# Patient Record
Sex: MALE | Race: WHITE | NOT HISPANIC OR LATINO | URBAN - METROPOLITAN AREA
[De-identification: names, ages, dates, MRNs, and addresses within clinical notes are randomized per-mention and may not be internally consistent; named-entity substitution may affect disease eponyms.]

---

## 2017-08-11 ENCOUNTER — OUTPATIENT (OUTPATIENT)
Dept: OUTPATIENT SERVICES | Facility: HOSPITAL | Age: 67
LOS: 1 days | End: 2017-08-11
Payer: COMMERCIAL

## 2017-08-11 VITALS
RESPIRATION RATE: 18 BRPM | SYSTOLIC BLOOD PRESSURE: 173 MMHG | OXYGEN SATURATION: 97 % | TEMPERATURE: 98 F | WEIGHT: 264.55 LBS | HEART RATE: 53 BPM | HEIGHT: 72 IN | DIASTOLIC BLOOD PRESSURE: 92 MMHG

## 2017-08-11 DIAGNOSIS — Z98.890 OTHER SPECIFIED POSTPROCEDURAL STATES: Chronic | ICD-10-CM

## 2017-08-11 DIAGNOSIS — N36.8 OTHER SPECIFIED DISORDERS OF URETHRA: Chronic | ICD-10-CM

## 2017-08-11 DIAGNOSIS — Z01.810 ENCOUNTER FOR PREPROCEDURAL CARDIOVASCULAR EXAMINATION: ICD-10-CM

## 2017-08-11 LAB
ALBUMIN SERPL ELPH-MCNC: 4.2 G/DL — SIGNIFICANT CHANGE UP (ref 3.3–5.2)
ALP SERPL-CCNC: 44 U/L — SIGNIFICANT CHANGE UP (ref 40–120)
ALT FLD-CCNC: 12 U/L — SIGNIFICANT CHANGE UP
ANION GAP SERPL CALC-SCNC: 12 MMOL/L — SIGNIFICANT CHANGE UP (ref 5–17)
APTT BLD: 35 SEC — SIGNIFICANT CHANGE UP (ref 27.5–37.4)
AST SERPL-CCNC: 12 U/L — SIGNIFICANT CHANGE UP
BASOPHILS # BLD AUTO: 0 K/UL — SIGNIFICANT CHANGE UP (ref 0–0.2)
BASOPHILS NFR BLD AUTO: 0.3 % — SIGNIFICANT CHANGE UP (ref 0–2)
BILIRUB SERPL-MCNC: 0.6 MG/DL — SIGNIFICANT CHANGE UP (ref 0.4–2)
BUN SERPL-MCNC: 28 MG/DL — HIGH (ref 8–20)
CALCIUM SERPL-MCNC: 8.9 MG/DL — SIGNIFICANT CHANGE UP (ref 8.6–10.2)
CHLORIDE SERPL-SCNC: 103 MMOL/L — SIGNIFICANT CHANGE UP (ref 98–107)
CO2 SERPL-SCNC: 27 MMOL/L — SIGNIFICANT CHANGE UP (ref 22–29)
CREAT SERPL-MCNC: 1.05 MG/DL — SIGNIFICANT CHANGE UP (ref 0.5–1.3)
EOSINOPHIL # BLD AUTO: 0.2 K/UL — SIGNIFICANT CHANGE UP (ref 0–0.5)
EOSINOPHIL NFR BLD AUTO: 2.9 % — SIGNIFICANT CHANGE UP (ref 0–5)
GLUCOSE SERPL-MCNC: 88 MG/DL — SIGNIFICANT CHANGE UP (ref 70–115)
HCT VFR BLD CALC: 44.9 % — SIGNIFICANT CHANGE UP (ref 42–52)
HGB BLD-MCNC: 15.1 G/DL — SIGNIFICANT CHANGE UP (ref 14–18)
INR BLD: 1.02 RATIO — SIGNIFICANT CHANGE UP (ref 0.88–1.16)
LYMPHOCYTES # BLD AUTO: 1.9 K/UL — SIGNIFICANT CHANGE UP (ref 1–4.8)
LYMPHOCYTES # BLD AUTO: 29 % — SIGNIFICANT CHANGE UP (ref 20–55)
MCHC RBC-ENTMCNC: 31.2 PG — HIGH (ref 27–31)
MCHC RBC-ENTMCNC: 33.6 G/DL — SIGNIFICANT CHANGE UP (ref 32–36)
MCV RBC AUTO: 92.8 FL — SIGNIFICANT CHANGE UP (ref 80–94)
MONOCYTES # BLD AUTO: 0.6 K/UL — SIGNIFICANT CHANGE UP (ref 0–0.8)
MONOCYTES NFR BLD AUTO: 9.8 % — SIGNIFICANT CHANGE UP (ref 3–10)
NEUTROPHILS # BLD AUTO: 3.8 K/UL — SIGNIFICANT CHANGE UP (ref 1.8–8)
NEUTROPHILS NFR BLD AUTO: 57.8 % — SIGNIFICANT CHANGE UP (ref 37–73)
PLATELET # BLD AUTO: 174 K/UL — SIGNIFICANT CHANGE UP (ref 150–400)
POTASSIUM SERPL-MCNC: 4 MMOL/L — SIGNIFICANT CHANGE UP (ref 3.5–5.3)
POTASSIUM SERPL-SCNC: 4 MMOL/L — SIGNIFICANT CHANGE UP (ref 3.5–5.3)
PROT SERPL-MCNC: 6.5 G/DL — LOW (ref 6.6–8.7)
PROTHROM AB SERPL-ACNC: 11.2 SEC — SIGNIFICANT CHANGE UP (ref 9.8–12.7)
RBC # BLD: 4.84 M/UL — SIGNIFICANT CHANGE UP (ref 4.6–6.2)
RBC # FLD: 13 % — SIGNIFICANT CHANGE UP (ref 11–15.6)
SODIUM SERPL-SCNC: 142 MMOL/L — SIGNIFICANT CHANGE UP (ref 135–145)
WBC # BLD: 6.5 K/UL — SIGNIFICANT CHANGE UP (ref 4.8–10.8)
WBC # FLD AUTO: 6.5 K/UL — SIGNIFICANT CHANGE UP (ref 4.8–10.8)

## 2017-08-11 PROCEDURE — 36415 COLL VENOUS BLD VENIPUNCTURE: CPT

## 2017-08-11 PROCEDURE — 93010 ELECTROCARDIOGRAM REPORT: CPT

## 2017-08-11 PROCEDURE — 85730 THROMBOPLASTIN TIME PARTIAL: CPT

## 2017-08-11 PROCEDURE — 93005 ELECTROCARDIOGRAM TRACING: CPT

## 2017-08-11 PROCEDURE — 80053 COMPREHEN METABOLIC PANEL: CPT

## 2017-08-11 PROCEDURE — G0463: CPT

## 2017-08-11 PROCEDURE — 85027 COMPLETE CBC AUTOMATED: CPT

## 2017-08-11 PROCEDURE — 85610 PROTHROMBIN TIME: CPT

## 2017-08-11 NOTE — H&P PST ADULT - FAMILY HISTORY
Father  Still living? No  Family history of CVA, Age at diagnosis: 41-50     Mother  Still living? No  Family history of CVA, Age at diagnosis: 61-70

## 2017-08-11 NOTE — H&P PST ADULT - PMH
Cardiomyopathy    Chest pain    ETOH abuse    HLD (hyperlipidemia)    HTN (hypertension)    Mild aortic insufficiency    Obesity    Urethral disorder  urine retention likely secondary to urethral stricture

## 2017-08-11 NOTE — H&P PST ADULT - NSANTHOSAYNRD_GEN_A_CORE
No. ABDON screening performed.  STOP BANG Legend: 0-2 = LOW Risk; 3-4 = INTERMEDIATE Risk; 5-8 = HIGH Risk

## 2017-08-11 NOTE — ASU PATIENT PROFILE, ADULT - PMH
Cardiomyopathy    Chest pain    HLD (hyperlipidemia)    HTN (hypertension)    Mild aortic insufficiency    Obesity

## 2017-08-11 NOTE — H&P PST ADULT - HISTORY OF PRESENT ILLNESS
This is a 67 y/o male with h/o HTN, HPL, mild CM (possibly ETOH), ETOH abuse (last drink about one month ago) s/p recent c/o chest burning after night of drinking ETOH.  NST revealed anteroseptal ischemia, transient LV dilation, and mildly reduced EF.  Patient presents today for PST for elective LHC to r/o obstructive CAD.

## 2017-08-15 ENCOUNTER — INPATIENT (INPATIENT)
Facility: HOSPITAL | Age: 67
LOS: 0 days | Discharge: HOME | DRG: 247 | End: 2017-08-16
Attending: INTERNAL MEDICINE | Admitting: INTERNAL MEDICINE
Payer: COMMERCIAL

## 2017-08-15 VITALS
DIASTOLIC BLOOD PRESSURE: 92 MMHG | TEMPERATURE: 98 F | WEIGHT: 259.93 LBS | RESPIRATION RATE: 18 BRPM | HEIGHT: 72 IN | SYSTOLIC BLOOD PRESSURE: 168 MMHG | HEART RATE: 62 BPM

## 2017-08-15 DIAGNOSIS — N36.8 OTHER SPECIFIED DISORDERS OF URETHRA: Chronic | ICD-10-CM

## 2017-08-15 DIAGNOSIS — R94.39 ABNORMAL RESULT OF OTHER CARDIOVASCULAR FUNCTION STUDY: ICD-10-CM

## 2017-08-15 PROCEDURE — 93010 ELECTROCARDIOGRAM REPORT: CPT

## 2017-08-15 RX ORDER — ATORVASTATIN CALCIUM 80 MG/1
20 TABLET, FILM COATED ORAL AT BEDTIME
Qty: 0 | Refills: 0 | Status: DISCONTINUED | OUTPATIENT
Start: 2017-08-15 | End: 2017-08-16

## 2017-08-15 RX ORDER — LISINOPRIL 2.5 MG/1
40 TABLET ORAL DAILY
Qty: 0 | Refills: 0 | Status: DISCONTINUED | OUTPATIENT
Start: 2017-08-15 | End: 2017-08-16

## 2017-08-15 RX ORDER — PRASUGREL 5 MG/1
10 TABLET, FILM COATED ORAL DAILY
Qty: 0 | Refills: 0 | Status: DISCONTINUED | OUTPATIENT
Start: 2017-08-16 | End: 2017-08-16

## 2017-08-15 RX ORDER — ASPIRIN/CALCIUM CARB/MAGNESIUM 324 MG
325 TABLET ORAL DAILY
Qty: 0 | Refills: 0 | Status: DISCONTINUED | OUTPATIENT
Start: 2017-08-15 | End: 2017-08-16

## 2017-08-15 RX ORDER — AMLODIPINE BESYLATE 2.5 MG/1
5 TABLET ORAL DAILY
Qty: 0 | Refills: 0 | Status: DISCONTINUED | OUTPATIENT
Start: 2017-08-15 | End: 2017-08-16

## 2017-08-15 RX ORDER — ASPIRIN/CALCIUM CARB/MAGNESIUM 324 MG
1 TABLET ORAL
Qty: 0 | Refills: 0 | COMMUNITY

## 2017-08-15 RX ORDER — HYDRALAZINE HCL 50 MG
10 TABLET ORAL ONCE
Qty: 0 | Refills: 0 | Status: COMPLETED | OUTPATIENT
Start: 2017-08-15 | End: 2017-08-15

## 2017-08-15 RX ADMIN — ATORVASTATIN CALCIUM 20 MILLIGRAM(S): 80 TABLET, FILM COATED ORAL at 21:09

## 2017-08-15 RX ADMIN — AMLODIPINE BESYLATE 5 MILLIGRAM(S): 2.5 TABLET ORAL at 13:51

## 2017-08-15 RX ADMIN — Medication 10 MILLIGRAM(S): at 17:47

## 2017-08-15 NOTE — DISCHARGE NOTE ADULT - CARE PLAN
Principal Discharge DX:	CAD (coronary artery disease)  Goal:	No angina; optimal cardiac function Principal Discharge DX:	CAD (coronary artery disease)  Goal:	No angina; optimal cardiac function  Instructions for follow-up, activity and diet:	No heavy lifting, driving, sex, tub baths, swimming, or any activity that submerges the lower half of the body in water for 48 hours.  Limited walking and stairs for 48 hours.    Change the bandaid after 24 hours and every 24 hours after that.  Keep the puncture site dry and covered with a bandaid until a scab forms.    Observe the site frequently.  If bleeding or a large lump (the size of a golf ball or bigger) occurs lie flat, apply continuous direct pressure just above the puncture site for at least 10 minutes, and notify your physician immediately.  If the bleeding cannot be controlled, call 911 immediately for assistance.  Notify your physician of pain, swelling or any drainage.    Notify your physician immediately if coldness, numbness, discoloration or pain in your foot occurs.

## 2017-08-15 NOTE — CONSULT NOTE ADULT - SUBJECTIVE AND OBJECTIVE BOX
Patient is a 66y old  Male who presents with a chief complaint of dyspnea and abnormal nuclear stress test.    HPI:  66 year old male with hypertension, hyperlipidemia, and obesity who has been having dyspnea on exertion and had a nuclear stress test which showed anterospetal ischemia.  His EF was also diminished.    PAST MEDICAL & SURGICAL HISTORY:  Urethral disorder: urine retention likely secondary to urethral stricture  ETOH abuse  Chest pain  Obesity  HTN (hypertension)  HLD (hyperlipidemia)  Cardiomyopathy  Mild aortic insufficiency  Acute urethral obstruction: surgical procedure to relieve obstruction      Allergies    No Known Allergies    Intolerances        MEDICATIONS  (STANDING):  aspirin 325 milliGRAM(s) Oral daily  lisinopril 40 milliGRAM(s) Oral daily  atorvastatin 20 milliGRAM(s) Oral at bedtime    MEDICATIONS  (PRN):    aspirin 325 milliGRAM(s) Oral daily  lisinopril 40 milliGRAM(s) Oral daily  atorvastatin 20 milliGRAM(s) Oral at bedtime      FAMILY HISTORY:  Family history of CVA (Father, Mother)      SOCIAL HISTORY:    CIGARETTES:  Former    ALCOHOL: Moderate    REVIEW OF SYSTEMS:  CONSTITUTIONAL: No fever, weight loss, or fatigue  EYES: No eye pain, visual disturbances, or discharge  ENMT:  No difficulty hearing, tinnitus, vertigo; No sinus or throat pain  NECK: No pain or stiffness  RESPIRATORY: No cough, wheezing, chills or hemoptysis; No Shortness of Breath  CARDIOVASCULAR: No chest pain, palpitations, passing out, dizziness, or leg swelling  GASTROINTESTINAL: No abdominal or epigastric pain. No nausea, vomiting, or hematemesis; No diarrhea or constipation. No melena or hematochezia.  GENITOURINARY: No dysuria, frequency, hematuria, or incontinence  NEUROLOGICAL: No headaches, memory loss, loss of strength, numbness, or tremors  SKIN: No itching, burning, rashes, or lesions   LYMPH Nodes: No enlarged glands  ENDOCRINE: No heat or cold intolerance; No hair loss  MUSCULOSKELETAL: No joint pain or swelling; No muscle, back, or extremity pain  PSYCHIATRIC: No depression, anxiety, mood swings, or difficulty sleeping  HEME/LYMPH: No easy bruising, or bleeding gums  ALLERY AND IMMUNOLOGIC: No hives or eczema	    Vital Signs Last 24 Hrs  T(C): 36.8 (15 Aug 2017 08:21), Max: 36.8 (15 Aug 2017 08:21)  T(F): 98.2 (15 Aug 2017 08:21), Max: 98.2 (15 Aug 2017 08:21)  HR: 52 (15 Aug 2017 12:15) (52 - 62)  BP: 169/97 (15 Aug 2017 12:15) (147/94 - 169/97)  BP(mean): --  RR: 17 (15 Aug 2017 12:15) (12 - 18)  SpO2: 97% (15 Aug 2017 12:15) (96% - 97%)    Daily Height in cm: 182.88 (15 Aug 2017 08:21)    Daily     I&O's Detail      PHYSICAL EXAM:  Appearance: Normal, well nourished	  HEENT:   Normal oral mucosa, PERRL, EOMI, sclera non-icteric	  Lymphatic: No cervical lymphadenopathy  Cardiovascular: Normal S1 S2, No JVD, No cardiac murmurs, No carotid bruits, No peripheral edema  Respiratory: Lungs clear to auscultation	  Psychiatry: A & O x 3, Mood & affect appropriate  Gastrointestinal:  Soft, Non-tender, + BS, no bruits	  Skin: No rashes, No ecchymoses, No cyanosis  Neurologic: Grossly non-focal with full strength in all four extremities  Extremities: Normal range of motion, No clubbing, cyanosis or edema  Vascular: Peripheral pulses palpable 2+ bilaterally            I&O's Summary    BNP  RADIOLOGY & ADDITIONAL STUDIES:    Assessment:  66 year old male with hypertension, hyperlipidemia, and obesity who has been having dyspnea on exertion and had a nuclear stress test which showed anterospetal ischemia.  His EF was also diminished.        Plan:    Cardiac catheterization.  Cardiac catheterization and possible percutaneous intervention recommended.  Risks, benefits, and alternatives reviewed.  Risks including but not limited to MI, death, stroke, bleeding, infection, vessel injury, hematoma, renal failure, allergic reaction, urgent open heart surgery, restenosis and stent thrombosis were reviewed.  All questions answered.  Patient is agreeable to proceed.

## 2017-08-15 NOTE — DISCHARGE NOTE ADULT - CARE PROVIDER_API CALL
Jil Ayers (MD), Cardiovascular Disease  [address] [place] [address]  Phone: [phone]  Fax: [phone]

## 2017-08-15 NOTE — DISCHARGE NOTE ADULT - NS AS ACTIVITY OBS
Do not drive or operate machinery/No Heavy lifting/straining/Walking-Outdoors allowed/Do not make important decisions/Showering allowed/Walking-Indoors allowed

## 2017-08-15 NOTE — DISCHARGE NOTE ADULT - PATIENT PORTAL LINK FT
“You can access the FollowHealth Patient Portal, offered by [facility], by registering with the following website: [email or web address]”

## 2017-08-15 NOTE — PROGRESS NOTE ADULT - SUBJECTIVE AND OBJECTIVE BOX
Subjective:  s/p LHC   < from: Cardiac Cath Lab - Adult (08.15.17 @ 09:32) >  VENTRICLES: Analysis of regional contractile function demonstrated mild  posterobasal hypokinesis. Global left ventricular function was borderline  normal. EF estimated was 55 %.  VALVES: MITRAL VALVE: The mitral valve exhibited mild regurgitation.  CORONARY VESSELS: The coronary circulation is right dominant.  LM:   --  LM: Angiography showed mild atherosclerosis with no flow limiting  lesions.  LAD:   --  Proximal LAD: There was a tubular 90 % stenosis. The lesion was  moderately calcified.  --  Mid LAD: There was a diffuse 80 % stenosis.  CX:   --  Circumflex: Angiography showed minor luminal irregularities with  no flow limiting lesions.  RI:   --  Proximal ramus intermedius: Angiography showed mild  atherosclerosis with no flow limiting lesions.  RCA:   --  Proximal RCA: There was a discrete 20 % stenosis. The lesion was  eccentric.  --  RPDA: There was a tubular 90 % stenosis at the ostium of the vessel  segment.  --  RPLS: The distal vessel was supplied by moderate collaterals from  septal branches of LAD and the distal circumflex. There was a discrete 100  % stenosis.    1ST LESION INTERVENTIONS: A successful drug-eluting stent was performed on  the 90 % lesion in the proximal LAD. Following intervention there was an  excellent angiographic appearance with a 0 % residual stenosis.  2ND LESION INTERVENTIONS: A successful drug-eluting stent was performed on  the 80 % lesion in the mid LAD. Following intervention there was an  excellent angiographic appearance with a 0 % residual stenosis.  DIAGNOSTIC RECOMMENDATIONS: Aspirin and Effient for at least one year.  Patient to return in 2-4 weeks for PCI of the RPDA.    < end of copied text >    RFA access angioseal  Returned to holding room stable, denies chest pain  Dr Butler at bedside with family    PAST MEDICAL & SURGICAL HISTORY:  Urethral disorder: urine retention likely secondary to urethral stricture  ETOH abuse  Chest pain  Obesity  HTN (hypertension)  HLD (hyperlipidemia)  Cardiomyopathy  Mild aortic insufficiency  Acute urethral obstruction: surgical procedure to relieve obstruction    No Known Allergies    FAMILY HISTORY:  Family history of CVA (Father, Mother)    MEDICATIONS  (STANDING):  aspirin 325 milliGRAM(s) Oral daily  lisinopril 40 milliGRAM(s) Oral daily  atorvastatin 20 milliGRAM(s) Oral at bedtime      General: No fatigue, no fevers/chills  Respiratory: No dyspnea, no cough, no wheeze  CV: No chest pain, no palpitations  Abd: No nausea  Neuro: No headache, no dizziness      Objective:  Vital Signs Last 24 Hrs  T(C): 36.8 (15 Aug 2017 08:21), Max: 36.8 (15 Aug 2017 08:21)  T(F): 98.2 (15 Aug 2017 08:21), Max: 98.2 (15 Aug 2017 08:21)  HR: 52 (15 Aug 2017 11:30) (52 - 62)  BP: 147/94 (15 Aug 2017 11:30) (147/94 - 168/92)  BP(mean): --  RR: 15 (15 Aug 2017 11:30) (12 - 18)  SpO2: 97% (15 Aug 2017 11:30) (96% - 97%)    EKG: SB 58 LAD inf, sep, ant Q poor R progression lat ST abnormality    NEURO: A & O x 3, no focal neurologic deficits  PULM:  CTA B/L No W/R/R  CARD: RRR, +S1, +S2, No M/R/G  ABD: ND, +BS, NT, no masses  EXT: R groin without hematoma or bleed; no LE edema  PULSES: + 2 right DP      A/P: 67 y/o male with h/o HTN, HLD, mild CM (possibly ETOH), ETOH abuse (last drink about one month ago) s/p recent c/o chest burning after night of drinking ETOH.  NST revealed anteroseptal ischemia, transient LV dilation, and mildly reduced EF now s/p LHC with LVEF 55%, PCI SARA to proximal and mid LAD  -  Aspirin and effient daily, continue ACEI; added statin  -  Importance of DAPT discussed   -  Bedrest x 2 hours; notify provider with site complications  -  Groin management discussed with patient  -  Lifestyle modification, diet and activity discussed; pt verbalized understanding  -  Non-smoker   -  Admit to   -  Follow up as an outpatient with Andria  -  Labs, EKG in am  -  Probable discharge in am

## 2017-08-15 NOTE — DISCHARGE NOTE ADULT - PLAN OF CARE
No angina; optimal cardiac function No heavy lifting, driving, sex, tub baths, swimming, or any activity that submerges the lower half of the body in water for 48 hours.  Limited walking and stairs for 48 hours.    Change the bandaid after 24 hours and every 24 hours after that.  Keep the puncture site dry and covered with a bandaid until a scab forms.    Observe the site frequently.  If bleeding or a large lump (the size of a golf ball or bigger) occurs lie flat, apply continuous direct pressure just above the puncture site for at least 10 minutes, and notify your physician immediately.  If the bleeding cannot be controlled, call 911 immediately for assistance.  Notify your physician of pain, swelling or any drainage.    Notify your physician immediately if coldness, numbness, discoloration or pain in your foot occurs.

## 2017-08-15 NOTE — DISCHARGE NOTE ADULT - MEDICATION SUMMARY - MEDICATIONS TO TAKE
I will START or STAY ON the medications listed below when I get home from the hospital:    Cialis 5 mg oral tablet  -- 1 tab(s) by mouth once a day, As Needed  -- Indication: For as needed    aspirin 325 mg oral tablet  -- 325 milligram(s) by mouth once a day  -- Indication: For CAD (coronary artery disease)    lisinopril 40 mg oral tablet  -- 1 tab(s) by mouth once a day  -- Indication: For HTN    atorvastatin 20 mg oral tablet  -- 1 tab(s) by mouth once a day (at bedtime)  -- Indication: For CAD (coronary artery disease)    prasugrel 10 mg oral tablet  -- 1 tab(s) by mouth once a day  -- Indication: For CAD (coronary artery disease)    amLODIPine 5 mg oral tablet  -- 1 tab(s) by mouth once a day  -- Indication: For CAD (coronary artery disease) I will START or STAY ON the medications listed below when I get home from the hospital:    Cialis 5 mg oral tablet  -- 1 tab(s) by mouth once a day, As Needed  -- Indication: For as needed    aspirin 325 mg oral tablet  -- 325 milligram(s) by mouth once a day  -- Indication: For CAD (coronary artery disease)    lisinopril 40 mg oral tablet  -- 1 tab(s) by mouth once a day  -- Indication: For HTN    atorvastatin 20 mg oral tablet  -- 1 tab(s) by mouth once a day (at bedtime)  -- Indication: For CAD (coronary artery disease)    prasugrel 10 mg oral tablet  -- 1 tab(s) by mouth once a day  -- Indication: For CAD (coronary artery disease)    metoprolol tartrate 25 mg oral tablet  -- 1 tab(s) by mouth 2 times a day  -- Indication: For Heart disease; hypertension

## 2017-08-16 VITALS — SYSTOLIC BLOOD PRESSURE: 159 MMHG | DIASTOLIC BLOOD PRESSURE: 83 MMHG | RESPIRATION RATE: 18 BRPM | HEART RATE: 66 BPM

## 2017-08-16 LAB
ALBUMIN SERPL ELPH-MCNC: 3.8 G/DL — SIGNIFICANT CHANGE UP (ref 3.3–5.2)
ALP SERPL-CCNC: 42 U/L — SIGNIFICANT CHANGE UP (ref 40–120)
ALT FLD-CCNC: 11 U/L — SIGNIFICANT CHANGE UP
ANION GAP SERPL CALC-SCNC: 12 MMOL/L — SIGNIFICANT CHANGE UP (ref 5–17)
AST SERPL-CCNC: 10 U/L — SIGNIFICANT CHANGE UP
BILIRUB DIRECT SERPL-MCNC: 0.1 MG/DL — SIGNIFICANT CHANGE UP (ref 0–0.3)
BILIRUB INDIRECT FLD-MCNC: 0.4 MG/DL — SIGNIFICANT CHANGE UP (ref 0.2–1)
BILIRUB SERPL-MCNC: 0.5 MG/DL — SIGNIFICANT CHANGE UP (ref 0.4–2)
BUN SERPL-MCNC: 21 MG/DL — HIGH (ref 8–20)
CALCIUM SERPL-MCNC: 8.6 MG/DL — SIGNIFICANT CHANGE UP (ref 8.6–10.2)
CHLORIDE SERPL-SCNC: 102 MMOL/L — SIGNIFICANT CHANGE UP (ref 98–107)
CHOLEST SERPL-MCNC: 170 MG/DL — SIGNIFICANT CHANGE UP (ref 110–199)
CO2 SERPL-SCNC: 25 MMOL/L — SIGNIFICANT CHANGE UP (ref 22–29)
CREAT SERPL-MCNC: 0.76 MG/DL — SIGNIFICANT CHANGE UP (ref 0.5–1.3)
GLUCOSE SERPL-MCNC: 109 MG/DL — SIGNIFICANT CHANGE UP (ref 70–115)
HCT VFR BLD CALC: 44.7 % — SIGNIFICANT CHANGE UP (ref 42–52)
HDLC SERPL-MCNC: 54 MG/DL — LOW
HGB BLD-MCNC: 14.9 G/DL — SIGNIFICANT CHANGE UP (ref 14–18)
LIPID PNL WITH DIRECT LDL SERPL: 98 MG/DL — SIGNIFICANT CHANGE UP
MAGNESIUM SERPL-MCNC: 2.1 MG/DL — SIGNIFICANT CHANGE UP (ref 1.6–2.6)
MCHC RBC-ENTMCNC: 30.6 PG — SIGNIFICANT CHANGE UP (ref 27–31)
MCHC RBC-ENTMCNC: 33.3 G/DL — SIGNIFICANT CHANGE UP (ref 32–36)
MCV RBC AUTO: 91.8 FL — SIGNIFICANT CHANGE UP (ref 80–94)
PLATELET # BLD AUTO: 158 K/UL — SIGNIFICANT CHANGE UP (ref 150–400)
POTASSIUM SERPL-MCNC: 3.8 MMOL/L — SIGNIFICANT CHANGE UP (ref 3.5–5.3)
POTASSIUM SERPL-SCNC: 3.8 MMOL/L — SIGNIFICANT CHANGE UP (ref 3.5–5.3)
PROT SERPL-MCNC: 6.3 G/DL — LOW (ref 6.6–8.7)
RBC # BLD: 4.87 M/UL — SIGNIFICANT CHANGE UP (ref 4.6–6.2)
RBC # FLD: 13 % — SIGNIFICANT CHANGE UP (ref 11–15.6)
SODIUM SERPL-SCNC: 139 MMOL/L — SIGNIFICANT CHANGE UP (ref 135–145)
TOTAL CHOLESTEROL/HDL RATIO MEASUREMENT: 3 RATIO — LOW (ref 3.4–9.6)
TRIGL SERPL-MCNC: 88 MG/DL — SIGNIFICANT CHANGE UP (ref 10–200)
WBC # BLD: 6.6 K/UL — SIGNIFICANT CHANGE UP (ref 4.8–10.8)
WBC # FLD AUTO: 6.6 K/UL — SIGNIFICANT CHANGE UP (ref 4.8–10.8)

## 2017-08-16 PROCEDURE — 36415 COLL VENOUS BLD VENIPUNCTURE: CPT

## 2017-08-16 PROCEDURE — 83735 ASSAY OF MAGNESIUM: CPT

## 2017-08-16 PROCEDURE — 93005 ELECTROCARDIOGRAM TRACING: CPT

## 2017-08-16 PROCEDURE — 92978 ENDOLUMINL IVUS OCT C 1ST: CPT | Mod: LD

## 2017-08-16 PROCEDURE — C1725: CPT

## 2017-08-16 PROCEDURE — 93458 L HRT ARTERY/VENTRICLE ANGIO: CPT

## 2017-08-16 PROCEDURE — 80061 LIPID PANEL: CPT

## 2017-08-16 PROCEDURE — C1874: CPT

## 2017-08-16 PROCEDURE — C1753: CPT

## 2017-08-16 PROCEDURE — C1894: CPT

## 2017-08-16 PROCEDURE — 85027 COMPLETE CBC AUTOMATED: CPT

## 2017-08-16 PROCEDURE — C1887: CPT

## 2017-08-16 PROCEDURE — C1760: CPT

## 2017-08-16 PROCEDURE — C1769: CPT

## 2017-08-16 PROCEDURE — C9600: CPT | Mod: LD

## 2017-08-16 PROCEDURE — 93010 ELECTROCARDIOGRAM REPORT: CPT

## 2017-08-16 PROCEDURE — 80076 HEPATIC FUNCTION PANEL: CPT

## 2017-08-16 PROCEDURE — 80048 BASIC METABOLIC PNL TOTAL CA: CPT

## 2017-08-16 RX ORDER — AMLODIPINE BESYLATE 2.5 MG/1
1 TABLET ORAL
Qty: 30 | Refills: 0 | OUTPATIENT
Start: 2017-08-16

## 2017-08-16 RX ORDER — ATORVASTATIN CALCIUM 80 MG/1
1 TABLET, FILM COATED ORAL
Qty: 30 | Refills: 2 | OUTPATIENT
Start: 2017-08-16

## 2017-08-16 RX ORDER — METOPROLOL TARTRATE 50 MG
25 TABLET ORAL
Qty: 0 | Refills: 0 | Status: DISCONTINUED | OUTPATIENT
Start: 2017-08-16 | End: 2017-08-16

## 2017-08-16 RX ORDER — PRASUGREL 5 MG/1
1 TABLET, FILM COATED ORAL
Qty: 30 | Refills: 11 | OUTPATIENT
Start: 2017-08-16

## 2017-08-16 RX ORDER — METOPROLOL TARTRATE 50 MG
1 TABLET ORAL
Qty: 60 | Refills: 0 | OUTPATIENT
Start: 2017-08-16

## 2017-08-16 RX ORDER — ATORVASTATIN CALCIUM 80 MG/1
1 TABLET, FILM COATED ORAL
Qty: 1 | Refills: 2 | OUTPATIENT
Start: 2017-08-16

## 2017-08-16 RX ADMIN — Medication 325 MILLIGRAM(S): at 08:27

## 2017-08-16 RX ADMIN — PRASUGREL 10 MILLIGRAM(S): 5 TABLET, FILM COATED ORAL at 08:27

## 2017-08-16 RX ADMIN — AMLODIPINE BESYLATE 5 MILLIGRAM(S): 2.5 TABLET ORAL at 05:44

## 2017-08-16 RX ADMIN — LISINOPRIL 40 MILLIGRAM(S): 2.5 TABLET ORAL at 05:44

## 2017-08-16 NOTE — PROGRESS NOTE ADULT - SUBJECTIVE AND OBJECTIVE BOX
Subjective:  s/p LHC with LVEF 55%, PCI SARA to proximal and mid LAD  RFA angioseal without complications, dressing removed  No complaints overnight  Ambulating this am without difficulty      PAST MEDICAL & SURGICAL HISTORY:  Urethral disorder: urine retention likely secondary to urethral stricture  ETOH abuse  Chest pain  Obesity  HTN (hypertension)  HLD (hyperlipidemia)  Cardiomyopathy  Mild aortic insufficiency  Acute urethral obstruction: surgical procedure to relieve obstruction    No Known Allergies    FAMILY HISTORY:  Family history of CVA    MEDICATIONS  (STANDING):  aspirin 325 milliGRAM(s) Oral daily  lisinopril 40 milliGRAM(s) Oral daily  prasugrel 10 milliGRAM(s) Oral daily  atorvastatin 20 milliGRAM(s) Oral at bedtime  amLODIPine   Tablet 5 milliGRAM(s) Oral daily  metoprolol 25 milliGRAM(s) Oral two times a day      General: No fatigue, no fevers/chills  Respiratory: No dyspnea, no cough, no wheeze  CV: No chest pain, no palpitations  Abd: No nausea  Neuro: No headache, no dizziness      Objective:  Vital Signs Last 24 Hrs  T(C): 36.6 (16 Aug 2017 05:30), Max: 36.8 (15 Aug 2017 08:21)  T(F): 97.8 (16 Aug 2017 05:30), Max: 98.2 (15 Aug 2017 08:21)  HR: 59 (16 Aug 2017 06:45) (52 - 68)  BP: 171/101 (16 Aug 2017 06:45) (142/83 - 185/97)  BP(mean): --  RR: 18 (16 Aug 2017 06:45) (12 - 18)  SpO2: 97% (15 Aug 2017 21:30) (95% - 97%)      NEURO: A & O x 3, no focal neurologic deficits  PULM:  CTA B/L No W/R/R  CARD: RRR, +S1, +S2, No M/R/G  ABD: ND, +BS, NT, no masses  EXT: right groin without hematoma or bleed  PULSES: + PT    Labs:                        14.9   6.6   )-----------( 158      ( 16 Aug 2017 05:43 )             44.7     08-16    139  |  102  |  21.0<H>  ----------------------------<  109  3.8   |  25.0  |  0.76    Ca    8.6      16 Aug 2017 05:43  Mg     2.1     08-16    TPro  6.3<L>  /  Alb  3.8  /  TBili  0.5  /  DBili  0.1  /  AST  10  /  ALT  11  /  AlkPhos  42  08-16    Tele: SR/SB     EKG: NSR 60 inf Q      A/P:  67 y/o male with h/o HTN, HLD, mild CM (possibly ETOH), ETOH abuse (last drink about one month ago) s/p recent c/o chest burning after night of drinking ETOH. NST revealed anteroseptal ischemia, transient LV dilation, and mildly reduced EF now s/p LHC with LVEF 55%, PCI SARA to proximal and mid LAD  -  Aspirin and Effient daily; added statin   -  Importance of DAPT discussed   HTN - continue ACEI, added lopressor   -  Groin management discussed with patient  -  Lifestyle modification, diet and activity discussed; pt verbalized understanding  -  Non-smoker   -  Follow up as an outpatient with Coudrey  -  Plan for PCI to RPDA in 2-4 weeks Subjective:  s/p LHC with LVEF 55%, PCI SARA to proximal and mid LAD  RFA angioseal without complications, dressing removed  No complaints overnight  Ambulating this am without difficulty      PAST MEDICAL & SURGICAL HISTORY:  Urethral disorder: urine retention likely secondary to urethral stricture  ETOH abuse  Chest pain  Obesity  HTN (hypertension)  HLD (hyperlipidemia)  Cardiomyopathy  Mild aortic insufficiency  Acute urethral obstruction: surgical procedure to relieve obstruction    No Known Allergies    FAMILY HISTORY:  Family history of CVA    MEDICATIONS  (STANDING):  aspirin 325 milliGRAM(s) Oral daily  lisinopril 40 milliGRAM(s) Oral daily  prasugrel 10 milliGRAM(s) Oral daily  atorvastatin 20 milliGRAM(s) Oral at bedtime  amLODIPine   Tablet 5 milliGRAM(s) Oral daily  metoprolol 25 milliGRAM(s) Oral two times a day      General: No fatigue, no fevers/chills  Respiratory: No dyspnea, no cough, no wheeze  CV: No chest pain, no palpitations  Abd: No nausea  Neuro: No headache, no dizziness      Objective:  Vital Signs Last 24 Hrs  T(C): 36.6 (16 Aug 2017 05:30), Max: 36.8 (15 Aug 2017 08:21)  T(F): 97.8 (16 Aug 2017 05:30), Max: 98.2 (15 Aug 2017 08:21)  HR: 59 (16 Aug 2017 06:45) (52 - 68)  BP: 171/101 (16 Aug 2017 06:45) (142/83 - 185/97)  BP(mean): --  RR: 18 (16 Aug 2017 06:45) (12 - 18)  SpO2: 97% (15 Aug 2017 21:30) (95% - 97%)      NEURO: A & O x 3, no focal neurologic deficits  PULM:  CTA B/L No W/R/R  CARD: RRR, +S1, +S2, No M/R/G  ABD: ND, +BS, NT, no masses  EXT: right groin without hematoma or bleed  PULSES: + PT    Labs:                        14.9   6.6   )-----------( 158      ( 16 Aug 2017 05:43 )             44.7     08-16    139  |  102  |  21.0<H>  ----------------------------<  109  3.8   |  25.0  |  0.76    Ca    8.6      16 Aug 2017 05:43  Mg     2.1     08-16    TPro  6.3<L>  /  Alb  3.8  /  TBili  0.5  /  DBili  0.1  /  AST  10  /  ALT  11  /  AlkPhos  42  08-16    Tele: SB overnight, SR this am     EKG: NSR 60 inf Q      A/P:  67 y/o male with h/o HTN, HLD, mild CM (possibly ETOH), ETOH abuse (last drink about one month ago) s/p recent c/o chest burning after night of drinking ETOH. NST revealed anteroseptal ischemia, transient LV dilation, and mildly reduced EF now s/p LHC with LVEF 55%, PCI SARA to proximal and mid LAD  -  Aspirin and Effient daily; added statin   -  Importance of DAPT discussed   HTN   - continue ACEI, added lopressor   Groin management discussed with patient  Lifestyle modification, diet and activity discussed; pt verbalized understanding  -  Non-smoker   Follow up as an outpatient with Coudrey  -  Plan for PCI to RPDA in 2-4 weeks  Discharge home

## 2017-09-08 PROBLEM — I35.1 NONRHEUMATIC AORTIC (VALVE) INSUFFICIENCY: Chronic | Status: ACTIVE | Noted: 2017-08-11

## 2017-09-08 PROBLEM — E78.5 HYPERLIPIDEMIA, UNSPECIFIED: Chronic | Status: ACTIVE | Noted: 2017-08-11

## 2017-09-08 PROBLEM — I10 ESSENTIAL (PRIMARY) HYPERTENSION: Chronic | Status: ACTIVE | Noted: 2017-08-11

## 2017-09-08 PROBLEM — E66.9 OBESITY, UNSPECIFIED: Chronic | Status: ACTIVE | Noted: 2017-08-11

## 2017-09-08 PROBLEM — I42.9 CARDIOMYOPATHY, UNSPECIFIED: Chronic | Status: ACTIVE | Noted: 2017-08-11

## 2017-09-08 PROBLEM — R07.9 CHEST PAIN, UNSPECIFIED: Chronic | Status: ACTIVE | Noted: 2017-08-11

## 2017-09-14 ENCOUNTER — OUTPATIENT (OUTPATIENT)
Dept: OUTPATIENT SERVICES | Facility: HOSPITAL | Age: 67
LOS: 1 days | End: 2017-09-14
Payer: COMMERCIAL

## 2017-09-14 VITALS
HEART RATE: 48 BPM | OXYGEN SATURATION: 98 % | TEMPERATURE: 98 F | SYSTOLIC BLOOD PRESSURE: 145 MMHG | RESPIRATION RATE: 16 BRPM | DIASTOLIC BLOOD PRESSURE: 87 MMHG

## 2017-09-14 VITALS — WEIGHT: 265 LBS

## 2017-09-14 DIAGNOSIS — R94.39 ABNORMAL RESULT OF OTHER CARDIOVASCULAR FUNCTION STUDY: ICD-10-CM

## 2017-09-14 DIAGNOSIS — R07.9 CHEST PAIN, UNSPECIFIED: ICD-10-CM

## 2017-09-14 DIAGNOSIS — Z01.810 ENCOUNTER FOR PREPROCEDURAL CARDIOVASCULAR EXAMINATION: ICD-10-CM

## 2017-09-14 DIAGNOSIS — N36.8 OTHER SPECIFIED DISORDERS OF URETHRA: Chronic | ICD-10-CM

## 2017-09-14 LAB
ALBUMIN SERPL ELPH-MCNC: 4 G/DL — SIGNIFICANT CHANGE UP (ref 3.3–5.2)
ALP SERPL-CCNC: 49 U/L — SIGNIFICANT CHANGE UP (ref 40–120)
ALT FLD-CCNC: 14 U/L — SIGNIFICANT CHANGE UP
ANION GAP SERPL CALC-SCNC: 10 MMOL/L — SIGNIFICANT CHANGE UP (ref 5–17)
APTT BLD: 35.2 SEC — SIGNIFICANT CHANGE UP (ref 27.5–37.4)
AST SERPL-CCNC: 13 U/L — SIGNIFICANT CHANGE UP
BILIRUB SERPL-MCNC: 0.7 MG/DL — SIGNIFICANT CHANGE UP (ref 0.4–2)
BUN SERPL-MCNC: 28 MG/DL — HIGH (ref 8–20)
CALCIUM SERPL-MCNC: 8.8 MG/DL — SIGNIFICANT CHANGE UP (ref 8.6–10.2)
CHLORIDE SERPL-SCNC: 105 MMOL/L — SIGNIFICANT CHANGE UP (ref 98–107)
CO2 SERPL-SCNC: 27 MMOL/L — SIGNIFICANT CHANGE UP (ref 22–29)
CREAT SERPL-MCNC: 0.87 MG/DL — SIGNIFICANT CHANGE UP (ref 0.5–1.3)
GLUCOSE SERPL-MCNC: 103 MG/DL — SIGNIFICANT CHANGE UP (ref 70–115)
HCT VFR BLD CALC: 43.2 % — SIGNIFICANT CHANGE UP (ref 42–52)
HGB BLD-MCNC: 14.7 G/DL — SIGNIFICANT CHANGE UP (ref 14–18)
INR BLD: 1.06 RATIO — SIGNIFICANT CHANGE UP (ref 0.88–1.16)
MCHC RBC-ENTMCNC: 31 PG — SIGNIFICANT CHANGE UP (ref 27–31)
MCHC RBC-ENTMCNC: 34 G/DL — SIGNIFICANT CHANGE UP (ref 32–36)
MCV RBC AUTO: 91.1 FL — SIGNIFICANT CHANGE UP (ref 80–94)
PLATELET # BLD AUTO: 175 K/UL — SIGNIFICANT CHANGE UP (ref 150–400)
POTASSIUM SERPL-MCNC: 3.8 MMOL/L — SIGNIFICANT CHANGE UP (ref 3.5–5.3)
POTASSIUM SERPL-SCNC: 3.8 MMOL/L — SIGNIFICANT CHANGE UP (ref 3.5–5.3)
PROT SERPL-MCNC: 6.4 G/DL — LOW (ref 6.6–8.7)
PROTHROM AB SERPL-ACNC: 11.7 SEC — SIGNIFICANT CHANGE UP (ref 9.8–12.7)
RBC # BLD: 4.74 M/UL — SIGNIFICANT CHANGE UP (ref 4.6–6.2)
RBC # FLD: 12.8 % — SIGNIFICANT CHANGE UP (ref 11–15.6)
SODIUM SERPL-SCNC: 142 MMOL/L — SIGNIFICANT CHANGE UP (ref 135–145)
WBC # BLD: 4.9 K/UL — SIGNIFICANT CHANGE UP (ref 4.8–10.8)
WBC # FLD AUTO: 4.9 K/UL — SIGNIFICANT CHANGE UP (ref 4.8–10.8)

## 2017-09-14 PROCEDURE — 85730 THROMBOPLASTIN TIME PARTIAL: CPT

## 2017-09-14 PROCEDURE — G0463: CPT

## 2017-09-14 PROCEDURE — 80053 COMPREHEN METABOLIC PANEL: CPT

## 2017-09-14 PROCEDURE — 93005 ELECTROCARDIOGRAM TRACING: CPT

## 2017-09-14 PROCEDURE — 85610 PROTHROMBIN TIME: CPT

## 2017-09-14 PROCEDURE — 93010 ELECTROCARDIOGRAM REPORT: CPT

## 2017-09-14 PROCEDURE — 85027 COMPLETE CBC AUTOMATED: CPT

## 2017-09-14 PROCEDURE — 36415 COLL VENOUS BLD VENIPUNCTURE: CPT

## 2017-09-14 RX ORDER — AMLODIPINE BESYLATE 2.5 MG/1
1 TABLET ORAL
Qty: 30 | Refills: 0 | OUTPATIENT
Start: 2017-09-14 | End: 2017-10-14

## 2017-09-14 RX ORDER — TADALAFIL 10 MG/1
1 TABLET, FILM COATED ORAL
Qty: 0 | Refills: 0 | COMMUNITY

## 2017-09-14 RX ORDER — METOPROLOL TARTRATE 50 MG
1 TABLET ORAL
Qty: 60 | Refills: 0 | OUTPATIENT
Start: 2017-09-14 | End: 2017-10-14

## 2017-09-14 NOTE — H&P PST ADULT - HISTORY OF PRESENT ILLNESS
This is a 65 y/o male with h/o HTN, HPL, mild CM (possibly ETOH), ETOH abuse (last drink about one month ago) s/p recent c/o chest burning after night of drinking ETOH.  NST revealed anteroseptal ischemia, transient LV dilation, and mildly reduced EF.  Patient presents today for PST for elective LHC to r/o obstructive CAD. This is a 67 y/o male with h/o HTN, HPL, mild CM (possibly ETOH), ETOH abuse (last drink about one month ago) s/p recent c/o chest burning after night of drinking ETOH.  NST revealed anteroseptal ischemia, transient LV dilation, and mildly reduced EF.  Patient presents today for PST for elective staged PCI of RPDA.

## 2017-09-14 NOTE — H&P PST ADULT - ASSESSMENT
65 y/o male with anteroseptal ischemia on NST scheduled for elective LHC to r/o obstructive CAD. 65 y/o male with anteroseptal ischemia s/p SARA LAD for staged PCI of RPDA.

## 2017-09-14 NOTE — H&P PST ADULT - PMH
Cardiomyopathy    Chest pain    ETOH abuse    HLD (hyperlipidemia)    HTN (hypertension)    Mild aortic insufficiency    Obesity    Urethral disorder  urine retention likely secondary to urethral stricture CAD (coronary artery disease)    Cardiomyopathy    Chest pain    ETOH abuse    HLD (hyperlipidemia)    HTN (hypertension)    Mild aortic insufficiency    Obesity    Urethral disorder  urine retention likely secondary to urethral stricture

## 2017-09-19 ENCOUNTER — INPATIENT (INPATIENT)
Facility: HOSPITAL | Age: 67
LOS: 0 days | Discharge: HOME | DRG: 247 | End: 2017-09-20
Attending: INTERNAL MEDICINE | Admitting: INTERNAL MEDICINE
Payer: COMMERCIAL

## 2017-09-19 ENCOUNTER — TRANSCRIPTION ENCOUNTER (OUTPATIENT)
Age: 67
End: 2017-09-19

## 2017-09-19 VITALS
RESPIRATION RATE: 18 BRPM | SYSTOLIC BLOOD PRESSURE: 144 MMHG | OXYGEN SATURATION: 97 % | DIASTOLIC BLOOD PRESSURE: 77 MMHG | HEART RATE: 51 BPM | TEMPERATURE: 98 F

## 2017-09-19 DIAGNOSIS — R07.9 CHEST PAIN, UNSPECIFIED: ICD-10-CM

## 2017-09-19 DIAGNOSIS — N36.8 OTHER SPECIFIED DISORDERS OF URETHRA: Chronic | ICD-10-CM

## 2017-09-19 DIAGNOSIS — R94.39 ABNORMAL RESULT OF OTHER CARDIOVASCULAR FUNCTION STUDY: ICD-10-CM

## 2017-09-19 DIAGNOSIS — Z01.810 ENCOUNTER FOR PREPROCEDURAL CARDIOVASCULAR EXAMINATION: ICD-10-CM

## 2017-09-19 RX ORDER — ATORVASTATIN CALCIUM 80 MG/1
20 TABLET, FILM COATED ORAL AT BEDTIME
Qty: 0 | Refills: 0 | Status: DISCONTINUED | OUTPATIENT
Start: 2017-09-19 | End: 2017-09-20

## 2017-09-19 RX ORDER — AMLODIPINE BESYLATE 2.5 MG/1
5 TABLET ORAL DAILY
Qty: 0 | Refills: 0 | Status: DISCONTINUED | OUTPATIENT
Start: 2017-09-19 | End: 2017-09-20

## 2017-09-19 RX ORDER — ASPIRIN/CALCIUM CARB/MAGNESIUM 324 MG
325 TABLET ORAL DAILY
Qty: 0 | Refills: 0 | Status: DISCONTINUED | OUTPATIENT
Start: 2017-09-19 | End: 2017-09-20

## 2017-09-19 RX ORDER — PRASUGREL 5 MG/1
10 TABLET, FILM COATED ORAL DAILY
Qty: 0 | Refills: 0 | Status: DISCONTINUED | OUTPATIENT
Start: 2017-09-19 | End: 2017-09-20

## 2017-09-19 RX ORDER — LISINOPRIL 2.5 MG/1
40 TABLET ORAL DAILY
Qty: 0 | Refills: 0 | Status: DISCONTINUED | OUTPATIENT
Start: 2017-09-19 | End: 2017-09-20

## 2017-09-19 RX ORDER — METOPROLOL TARTRATE 50 MG
25 TABLET ORAL
Qty: 0 | Refills: 0 | Status: DISCONTINUED | OUTPATIENT
Start: 2017-09-19 | End: 2017-09-20

## 2017-09-19 RX ORDER — SODIUM CHLORIDE 9 MG/ML
500 INJECTION INTRAMUSCULAR; INTRAVENOUS; SUBCUTANEOUS
Qty: 0 | Refills: 0 | Status: DISCONTINUED | OUTPATIENT
Start: 2017-09-19 | End: 2017-09-20

## 2017-09-19 RX ADMIN — ATORVASTATIN CALCIUM 20 MILLIGRAM(S): 80 TABLET, FILM COATED ORAL at 21:08

## 2017-09-19 RX ADMIN — SODIUM CHLORIDE 30 MILLILITER(S): 9 INJECTION INTRAMUSCULAR; INTRAVENOUS; SUBCUTANEOUS at 07:48

## 2017-09-19 NOTE — DISCHARGE NOTE ADULT - NS AS ACTIVITY OBS
Do not make important decisions/Do not drive or operate machinery/Walking-Outdoors allowed/No Heavy lifting/straining/Showering allowed/Walking-Indoors allowed

## 2017-09-19 NOTE — PROGRESS NOTE ADULT - SUBJECTIVE AND OBJECTIVE BOX
Subjective:  66y  Male s/p LHC with PCI SARA to RPDA. Official report pending. LFA angioseal without complications.     PAST MEDICAL & SURGICAL HISTORY:  CAD (coronary artery disease)  Urethral disorder: urine retention likely secondary to urethral stricture  ETOH abuse  Chest pain  Obesity  HTN (hypertension)  HLD (hyperlipidemia)  Cardiomyopathy  Mild aortic insufficiency  Acute urethral obstruction: surgical procedure to relieve obstruction    No Known Allergies    FAMILY HISTORY:  Family history of CVA    MEDICATIONS  (STANDING):  sodium chloride 0.9%. 500 milliLiter(s) (30 mL/Hr) IV Continuous <Continuous>  aspirin 325 milliGRAM(s) Oral daily  lisinopril 40 milliGRAM(s) Oral daily  atorvastatin 20 milliGRAM(s) Oral at bedtime  prasugrel 10 milliGRAM(s) Oral daily  metoprolol 25 milliGRAM(s) Oral two times a day  amLODIPine   Tablet 5 milliGRAM(s) Oral daily      General: No fatigue, no fevers/chills  Respiratory: No dyspnea, no cough, no wheeze  CV: No chest pain, no palpitations  Abd: No nausea  Neuro: No headache, no dizziness      Objective:  Vital Signs Last 24 Hrs  T(C): 36.8 (19 Sep 2017 07:30), Max: 36.8 (19 Sep 2017 07:30)  T(F): 98.2 (19 Sep 2017 07:30), Max: 98.2 (19 Sep 2017 07:30)  HR: 48 (19 Sep 2017 09:30) (48 - 55)  BP: 137/72 (19 Sep 2017 09:30) (137/72 - 154/93)  BP(mean): --  RR: 18 (19 Sep 2017 09:30) (16 - 18)  SpO2: 98% (19 Sep 2017 09:30) (97% - 98%)    EKG: SB 56 with PVC    NEURO: A & O x 3, no focal neurologic deficits  PULM:  CTA B/L No W/R/R  CARD: RRR, +S1, +S2, No M/R/G  ABD: ND, +BS, NT, no masses  EXT: left groin without hematoma or bleed  PULSES: +PP    A/P:  s/p LHC with PCI SARA to RPDA  -  Continue Aspirin, Effient, Statin, BB, ACE, CCB  -  Importance of DAPT discussed   -  Continue current medications  -  Bedrest x 2 hours; notify provider with site complications  -  Follow up as an outpatient with Dr Ayers  -  Labs, EKG in am  -  Probable discharge in am

## 2017-09-19 NOTE — DISCHARGE NOTE ADULT - CARE PROVIDER_API CALL
Jil Ayers (MD), Cardiovascular Disease  [address] [place] [address]  Phone: [phone]  Fax: [phone]

## 2017-09-19 NOTE — DISCHARGE NOTE ADULT - ADDITIONAL INSTRUCTIONS
Follow up with your Cardiologist in 1 - 2 weeks.  Take all medications as instructed.  Speak to your doctor before stopping any medications.  Follow the specified diet.

## 2017-09-19 NOTE — DISCHARGE NOTE ADULT - PATIENT PORTAL LINK FT
“You can access the FollowHealth Patient Portal, offered by [facility], by registering with the following website: [email or web address]”

## 2017-09-19 NOTE — DISCHARGE NOTE ADULT - CARE PLAN
Principal Discharge DX:	CAD (coronary artery disease)  Goal:	Optimal cardiac function  Instructions for follow-up, activity and diet:	No heavy lifting, driving, sex, tub baths, swimming, or any activity that submerges the lower half of the body in water for 48 hours.  Limited walking and stairs for 48 hours.    Change the bandaid after 24 hours and every 24 hours after that.  Keep the puncture site dry and covered with a bandaid until a scab forms.    Observe the site frequently.  If bleeding or a large lump (the size of a golf ball or bigger) occurs lie flat, apply continuous direct pressure just above the puncture site for at least 10 minutes, and notify your physician immediately.  If the bleeding cannot be controlled, call 911 immediately for assistance.  Notify your physician of pain, swelling or any drainage.    Notify your physician immediately if coldness, numbness, discoloration or pain in your foot occurs.

## 2017-09-19 NOTE — DISCHARGE NOTE ADULT - MEDICATION SUMMARY - MEDICATIONS TO TAKE
I will START or STAY ON the medications listed below when I get home from the hospital:    aspirin 325 mg oral tablet  -- 325 milligram(s) by mouth once a day  -- Indication: For CAD (coronary artery disease)    lisinopril 40 mg oral tablet  -- 1 tab(s) by mouth once a day  -- Indication: For CAD (coronary artery disease)    atorvastatin 20 mg oral tablet  -- 1 tab(s) by mouth once a day (at bedtime)  -- Indication: For CAD (coronary artery disease)    prasugrel 10 mg oral tablet  -- 1 tab(s) by mouth once a day  -- Indication: For CAD (coronary artery disease)    metoprolol tartrate 25 mg oral tablet  -- 0.5 tab(s) by mouth 2 times a day   -- Indication: For CAD (coronary artery disease)    Norvasc 5 mg oral tablet  -- 1 tab(s) by mouth once a day   -- It is very important that you take or use this exactly as directed.  Do not skip doses or discontinue unless directed by your doctor.  Some non-prescription drugs may aggravate your condition.  Read all labels carefully.  If a warning appears, check with your doctor before taking.    -- Indication: For CAD (coronary artery disease)

## 2017-09-20 VITALS
DIASTOLIC BLOOD PRESSURE: 85 MMHG | OXYGEN SATURATION: 98 % | HEART RATE: 60 BPM | RESPIRATION RATE: 17 BRPM | SYSTOLIC BLOOD PRESSURE: 138 MMHG

## 2017-09-20 LAB
ANION GAP SERPL CALC-SCNC: 9 MMOL/L — SIGNIFICANT CHANGE UP (ref 5–17)
BUN SERPL-MCNC: 24 MG/DL — HIGH (ref 8–20)
CALCIUM SERPL-MCNC: 8.6 MG/DL — SIGNIFICANT CHANGE UP (ref 8.6–10.2)
CHLORIDE SERPL-SCNC: 101 MMOL/L — SIGNIFICANT CHANGE UP (ref 98–107)
CO2 SERPL-SCNC: 27 MMOL/L — SIGNIFICANT CHANGE UP (ref 22–29)
CREAT SERPL-MCNC: 0.83 MG/DL — SIGNIFICANT CHANGE UP (ref 0.5–1.3)
GLUCOSE SERPL-MCNC: 100 MG/DL — SIGNIFICANT CHANGE UP (ref 70–115)
HCT VFR BLD CALC: 44.1 % — SIGNIFICANT CHANGE UP (ref 42–52)
HGB BLD-MCNC: 14.7 G/DL — SIGNIFICANT CHANGE UP (ref 14–18)
MAGNESIUM SERPL-MCNC: 2 MG/DL — SIGNIFICANT CHANGE UP (ref 1.6–2.6)
MCHC RBC-ENTMCNC: 30.9 PG — SIGNIFICANT CHANGE UP (ref 27–31)
MCHC RBC-ENTMCNC: 33.3 G/DL — SIGNIFICANT CHANGE UP (ref 32–36)
MCV RBC AUTO: 92.6 FL — SIGNIFICANT CHANGE UP (ref 80–94)
PLATELET # BLD AUTO: 188 K/UL — SIGNIFICANT CHANGE UP (ref 150–400)
POTASSIUM SERPL-MCNC: 4.3 MMOL/L — SIGNIFICANT CHANGE UP (ref 3.5–5.3)
POTASSIUM SERPL-SCNC: 4.3 MMOL/L — SIGNIFICANT CHANGE UP (ref 3.5–5.3)
RBC # BLD: 4.76 M/UL — SIGNIFICANT CHANGE UP (ref 4.6–6.2)
RBC # FLD: 13 % — SIGNIFICANT CHANGE UP (ref 11–15.6)
SODIUM SERPL-SCNC: 137 MMOL/L — SIGNIFICANT CHANGE UP (ref 135–145)
WBC # BLD: 6 K/UL — SIGNIFICANT CHANGE UP (ref 4.8–10.8)
WBC # FLD AUTO: 6 K/UL — SIGNIFICANT CHANGE UP (ref 4.8–10.8)

## 2017-09-20 PROCEDURE — C9600: CPT | Mod: RC

## 2017-09-20 PROCEDURE — C1887: CPT

## 2017-09-20 PROCEDURE — C1769: CPT

## 2017-09-20 PROCEDURE — C1874: CPT

## 2017-09-20 PROCEDURE — 36415 COLL VENOUS BLD VENIPUNCTURE: CPT

## 2017-09-20 PROCEDURE — 80048 BASIC METABOLIC PNL TOTAL CA: CPT

## 2017-09-20 PROCEDURE — 93010 ELECTROCARDIOGRAM REPORT: CPT

## 2017-09-20 PROCEDURE — 93005 ELECTROCARDIOGRAM TRACING: CPT

## 2017-09-20 PROCEDURE — C1760: CPT

## 2017-09-20 PROCEDURE — 83735 ASSAY OF MAGNESIUM: CPT

## 2017-09-20 PROCEDURE — C1725: CPT

## 2017-09-20 PROCEDURE — C1894: CPT

## 2017-09-20 PROCEDURE — 85027 COMPLETE CBC AUTOMATED: CPT

## 2017-09-20 RX ORDER — METOPROLOL TARTRATE 50 MG
12.5 TABLET ORAL
Qty: 0 | Refills: 0 | Status: DISCONTINUED | OUTPATIENT
Start: 2017-09-20 | End: 2017-09-20

## 2017-09-20 RX ORDER — METOPROLOL TARTRATE 50 MG
0.5 TABLET ORAL
Qty: 60 | Refills: 0 | OUTPATIENT
Start: 2017-09-20

## 2017-09-20 RX ORDER — AMLODIPINE BESYLATE 2.5 MG/1
0 TABLET ORAL
Qty: 0 | Refills: 0 | COMMUNITY

## 2017-09-20 RX ADMIN — LISINOPRIL 40 MILLIGRAM(S): 2.5 TABLET ORAL at 05:49

## 2017-09-20 RX ADMIN — Medication 12.5 MILLIGRAM(S): at 08:36

## 2017-09-20 RX ADMIN — PRASUGREL 10 MILLIGRAM(S): 5 TABLET, FILM COATED ORAL at 08:36

## 2017-09-20 RX ADMIN — AMLODIPINE BESYLATE 5 MILLIGRAM(S): 2.5 TABLET ORAL at 08:36

## 2017-09-20 RX ADMIN — Medication 325 MILLIGRAM(S): at 08:36

## 2017-09-20 NOTE — PROGRESS NOTE ADULT - SUBJECTIVE AND OBJECTIVE BOX
Interval History:  s/p [facility] for successful staged PCI SARA to RPDA  LFA access without complications  No complaints overnight. Ambulating this am without difficulty      PAST MEDICAL & SURGICAL HISTORY:  CAD (coronary artery disease)  Urethral disorder: urine retention likely secondary to urethral stricture  ETOH abuse  Chest pain  Obesity  HTN (hypertension)  HLD (hyperlipidemia)  Cardiomyopathy  Mild aortic insufficiency  Acute urethral obstruction: surgical procedure to relieve obstruction    No Known Allergies    FAMILY HISTORY:  Family history of CVA    MEDICATIONS  (STANDING):  lisinopril 40 milliGRAM(s) Oral daily  sodium chloride 0.9%. 500 milliLiter(s) (30 mL/Hr) IV Continuous <Continuous>  aspirin 325 milliGRAM(s) Oral daily  metoprolol 25 milliGRAM(s) Oral two times a day  atorvastatin 20 milliGRAM(s) Oral at bedtime  prasugrel 10 milliGRAM(s) Oral daily  amLODIPine   Tablet 5 milliGRAM(s) Oral daily      General: No fatigue, no fevers/chills  Respiratory: No dyspnea, no cough, no wheeze  CV: No chest pain, no palpitations  Abd: No nausea  Neuro: No headache, no dizziness      Objective:  Vital Signs Last 24 Hrs  T(C): 36.6 (19 Sep 2017 20:00), Max: 36.6 (19 Sep 2017 20:00)  T(F): 97.9 (19 Sep 2017 20:00), Max: 97.9 (19 Sep 2017 20:00)  HR: 58 (20 Sep 2017 06:05) (45 - 58)  BP: 160/95 (20 Sep 2017 06:05) (124/74 - 174/103)  BP(mean): --  RR: 16 (20 Sep 2017 06:05) (12 - 20)  SpO2: 97% (19 Sep 2017 22:00) (94% - 98%)      NEURO: A & O x 3, no focal neurologic deficits  PULM:  CTA B/L No W/R/R  CARD: RRR, +S1, +S2, No M/R/G  ABD: ND, +BS, NT, no masses  EXT: left groin without hematoma or bleed  PULSES: +PP    Labs:                        14.7   6.0   )-----------( 188      ( 20 Sep 2017 06:17 )             44.1     09-20    137  |  101  |  24.0<H>  ----------------------------<  100  4.3   |  27.0  |  0.83    Ca    8.6      20 Sep 2017 06:17  Mg     2.0     09-20      EKG: SB 49 bpm  Tele: SB      A/P:  This is a 65 y/o male with h/o HTN, HPL, mild CM (possibly ETOH), ETOH abuse (last drink about one month ago) s/p recent c/o chest burning after night of drinking ETOH.  NST revealed anteroseptal ischemia, transient LV dilation, and mildly reduced EF. Pt now s/p [facility] for successful staged PCI SARA to RPDA  -  Continue aspirin, effient, CCB, statin   -  Importance of DAPT discussed   -  Decreased metoprolol to 12.5 mg BID secondary to bradycardia 40s  -  Groin or wrist management discussed with patient   -  Lifestyle modification, diet and activity discussed; pt verbalized understanding  -  Non-smoker   -  Follow up as an outpatient with Dr Andria DOYLE to discharge home

## 2018-07-16 ENCOUNTER — INPATIENT (INPATIENT)
Facility: HOSPITAL | Age: 68
LOS: 0 days | Discharge: HOME | DRG: 571 | End: 2018-07-17
Attending: SURGERY | Admitting: SURGERY
Payer: MEDICARE

## 2018-07-16 ENCOUNTER — TRANSCRIPTION ENCOUNTER (OUTPATIENT)
Age: 68
End: 2018-07-16

## 2018-07-16 VITALS
OXYGEN SATURATION: 95 % | DIASTOLIC BLOOD PRESSURE: 80 MMHG | HEART RATE: 89 BPM | RESPIRATION RATE: 20 BRPM | WEIGHT: 250 LBS | HEIGHT: 72 IN | SYSTOLIC BLOOD PRESSURE: 155 MMHG | TEMPERATURE: 103 F

## 2018-07-16 DIAGNOSIS — L03.116 CELLULITIS OF LEFT LOWER LIMB: ICD-10-CM

## 2018-07-16 DIAGNOSIS — N36.8 OTHER SPECIFIED DISORDERS OF URETHRA: Chronic | ICD-10-CM

## 2018-07-16 DIAGNOSIS — Z95.5 PRESENCE OF CORONARY ANGIOPLASTY IMPLANT AND GRAFT: Chronic | ICD-10-CM

## 2018-07-16 PROBLEM — F10.10 ALCOHOL ABUSE, UNCOMPLICATED: Chronic | Status: ACTIVE | Noted: 2017-08-11

## 2018-07-16 PROBLEM — N36.9 URETHRAL DISORDER, UNSPECIFIED: Chronic | Status: ACTIVE | Noted: 2017-08-11

## 2018-07-16 PROBLEM — I25.10 ATHEROSCLEROTIC HEART DISEASE OF NATIVE CORONARY ARTERY WITHOUT ANGINA PECTORIS: Chronic | Status: ACTIVE | Noted: 2017-09-14

## 2018-07-16 LAB
ALBUMIN SERPL ELPH-MCNC: 3.6 G/DL — SIGNIFICANT CHANGE UP (ref 3.3–5.2)
ALP SERPL-CCNC: 56 U/L — SIGNIFICANT CHANGE UP (ref 40–120)
ALT FLD-CCNC: 24 U/L — SIGNIFICANT CHANGE UP
ANION GAP SERPL CALC-SCNC: 15 MMOL/L — SIGNIFICANT CHANGE UP (ref 5–17)
AST SERPL-CCNC: 29 U/L — SIGNIFICANT CHANGE UP
BASOPHILS # BLD AUTO: 0 K/UL — SIGNIFICANT CHANGE UP (ref 0–0.2)
BASOPHILS NFR BLD AUTO: 0.3 % — SIGNIFICANT CHANGE UP (ref 0–2)
BILIRUB SERPL-MCNC: 0.6 MG/DL — SIGNIFICANT CHANGE UP (ref 0.4–2)
BLD GP AB SCN SERPL QL: SIGNIFICANT CHANGE UP
BUN SERPL-MCNC: 23 MG/DL — HIGH (ref 8–20)
CALCIUM SERPL-MCNC: 9 MG/DL — SIGNIFICANT CHANGE UP (ref 8.6–10.2)
CHLORIDE SERPL-SCNC: 98 MMOL/L — SIGNIFICANT CHANGE UP (ref 98–107)
CK SERPL-CCNC: 78 U/L — SIGNIFICANT CHANGE UP (ref 30–200)
CO2 SERPL-SCNC: 22 MMOL/L — SIGNIFICANT CHANGE UP (ref 22–29)
CREAT SERPL-MCNC: 1.04 MG/DL — SIGNIFICANT CHANGE UP (ref 0.5–1.3)
EOSINOPHIL # BLD AUTO: 0 K/UL — SIGNIFICANT CHANGE UP (ref 0–0.5)
EOSINOPHIL NFR BLD AUTO: 0.6 % — SIGNIFICANT CHANGE UP (ref 0–6)
GLUCOSE SERPL-MCNC: 112 MG/DL — SIGNIFICANT CHANGE UP (ref 70–115)
HCT VFR BLD CALC: 44.7 % — SIGNIFICANT CHANGE UP (ref 42–52)
HGB BLD-MCNC: 14.8 G/DL — SIGNIFICANT CHANGE UP (ref 14–18)
LACTATE BLDV-MCNC: 1.1 MMOL/L — SIGNIFICANT CHANGE UP (ref 0.5–2)
LYMPHOCYTES # BLD AUTO: 0.5 K/UL — LOW (ref 1–4.8)
LYMPHOCYTES # BLD AUTO: 8 % — LOW (ref 20–55)
MCHC RBC-ENTMCNC: 30.6 PG — SIGNIFICANT CHANGE UP (ref 27–31)
MCHC RBC-ENTMCNC: 33.1 G/DL — SIGNIFICANT CHANGE UP (ref 32–36)
MCV RBC AUTO: 92.4 FL — SIGNIFICANT CHANGE UP (ref 80–94)
MONOCYTES # BLD AUTO: 0.5 K/UL — SIGNIFICANT CHANGE UP (ref 0–0.8)
MONOCYTES NFR BLD AUTO: 7.6 % — SIGNIFICANT CHANGE UP (ref 3–10)
NEUTROPHILS # BLD AUTO: 5.1 K/UL — SIGNIFICANT CHANGE UP (ref 1.8–8)
NEUTROPHILS NFR BLD AUTO: 83.2 % — HIGH (ref 37–73)
PLATELET # BLD AUTO: 145 K/UL — LOW (ref 150–400)
POTASSIUM SERPL-MCNC: 4.7 MMOL/L — SIGNIFICANT CHANGE UP (ref 3.5–5.3)
POTASSIUM SERPL-SCNC: 4.7 MMOL/L — SIGNIFICANT CHANGE UP (ref 3.5–5.3)
PROCALCITONIN SERPL-MCNC: 0.17 NG/ML — HIGH (ref 0–0.04)
PROT SERPL-MCNC: 6.6 G/DL — SIGNIFICANT CHANGE UP (ref 6.6–8.7)
RBC # BLD: 4.84 M/UL — SIGNIFICANT CHANGE UP (ref 4.6–6.2)
RBC # FLD: 13 % — SIGNIFICANT CHANGE UP (ref 11–15.6)
SODIUM SERPL-SCNC: 135 MMOL/L — SIGNIFICANT CHANGE UP (ref 135–145)
TYPE + AB SCN PNL BLD: SIGNIFICANT CHANGE UP
WBC # BLD: 6.2 K/UL — SIGNIFICANT CHANGE UP (ref 4.8–10.8)
WBC # FLD AUTO: 6.2 K/UL — SIGNIFICANT CHANGE UP (ref 4.8–10.8)

## 2018-07-16 PROCEDURE — 99285 EMERGENCY DEPT VISIT HI MDM: CPT

## 2018-07-16 PROCEDURE — 76882 US LMTD JT/FCL EVL NVASC XTR: CPT | Mod: 26,59,LT

## 2018-07-16 PROCEDURE — 93971 EXTREMITY STUDY: CPT | Mod: 26,LT

## 2018-07-16 PROCEDURE — 93010 ELECTROCARDIOGRAM REPORT: CPT

## 2018-07-16 PROCEDURE — 73590 X-RAY EXAM OF LOWER LEG: CPT | Mod: 26,LT

## 2018-07-16 PROCEDURE — 71046 X-RAY EXAM CHEST 2 VIEWS: CPT | Mod: 26

## 2018-07-16 RX ORDER — PIPERACILLIN AND TAZOBACTAM 4; .5 G/20ML; G/20ML
3.38 INJECTION, POWDER, LYOPHILIZED, FOR SOLUTION INTRAVENOUS EVERY 8 HOURS
Qty: 0 | Refills: 0 | Status: DISCONTINUED | OUTPATIENT
Start: 2018-07-16 | End: 2018-07-16

## 2018-07-16 RX ORDER — VANCOMYCIN HCL 1 G
1000 VIAL (EA) INTRAVENOUS EVERY 12 HOURS
Qty: 0 | Refills: 0 | Status: DISCONTINUED | OUTPATIENT
Start: 2018-07-16 | End: 2018-07-16

## 2018-07-16 RX ORDER — TETANUS TOXOID, REDUCED DIPHTHERIA TOXOID AND ACELLULAR PERTUSSIS VACCINE, ADSORBED 5; 2.5; 8; 8; 2.5 [IU]/.5ML; [IU]/.5ML; UG/.5ML; UG/.5ML; UG/.5ML
0.5 SUSPENSION INTRAMUSCULAR ONCE
Qty: 0 | Refills: 0 | Status: COMPLETED | OUTPATIENT
Start: 2018-07-16 | End: 2018-07-16

## 2018-07-16 RX ORDER — VANCOMYCIN HCL 1 G
1500 VIAL (EA) INTRAVENOUS ONCE
Qty: 0 | Refills: 0 | Status: COMPLETED | OUTPATIENT
Start: 2018-07-16 | End: 2018-07-16

## 2018-07-16 RX ORDER — CEFEPIME 1 G/1
1000 INJECTION, POWDER, FOR SOLUTION INTRAMUSCULAR; INTRAVENOUS ONCE
Qty: 0 | Refills: 0 | Status: DISCONTINUED | OUTPATIENT
Start: 2018-07-16 | End: 2018-07-16

## 2018-07-16 RX ORDER — VANCOMYCIN HCL 1 G
1000 VIAL (EA) INTRAVENOUS ONCE
Qty: 0 | Refills: 0 | Status: COMPLETED | OUTPATIENT
Start: 2018-07-16 | End: 2018-07-16

## 2018-07-16 RX ORDER — ENOXAPARIN SODIUM 100 MG/ML
40 INJECTION SUBCUTANEOUS EVERY 24 HOURS
Qty: 0 | Refills: 0 | Status: DISCONTINUED | OUTPATIENT
Start: 2018-07-17 | End: 2018-07-17

## 2018-07-16 RX ORDER — ACETAMINOPHEN 500 MG
650 TABLET ORAL ONCE
Qty: 0 | Refills: 0 | Status: COMPLETED | OUTPATIENT
Start: 2018-07-16 | End: 2018-07-16

## 2018-07-16 RX ORDER — CEFEPIME 1 G/1
2000 INJECTION, POWDER, FOR SOLUTION INTRAMUSCULAR; INTRAVENOUS ONCE
Qty: 0 | Refills: 0 | Status: COMPLETED | OUTPATIENT
Start: 2018-07-16 | End: 2018-07-16

## 2018-07-16 RX ORDER — FENTANYL CITRATE 50 UG/ML
25 INJECTION INTRAVENOUS
Qty: 0 | Refills: 0 | Status: DISCONTINUED | OUTPATIENT
Start: 2018-07-17 | End: 2018-07-17

## 2018-07-16 RX ORDER — PIPERACILLIN AND TAZOBACTAM 4; .5 G/20ML; G/20ML
3.38 INJECTION, POWDER, LYOPHILIZED, FOR SOLUTION INTRAVENOUS EVERY 8 HOURS
Qty: 0 | Refills: 0 | Status: DISCONTINUED | OUTPATIENT
Start: 2018-07-16 | End: 2018-07-17

## 2018-07-16 RX ORDER — HYDROMORPHONE HYDROCHLORIDE 2 MG/ML
0.5 INJECTION INTRAMUSCULAR; INTRAVENOUS; SUBCUTANEOUS
Qty: 0 | Refills: 0 | Status: DISCONTINUED | OUTPATIENT
Start: 2018-07-17 | End: 2018-07-17

## 2018-07-16 RX ORDER — CEFEPIME 1 G/1
2000 INJECTION, POWDER, FOR SOLUTION INTRAMUSCULAR; INTRAVENOUS ONCE
Qty: 0 | Refills: 0 | Status: DISCONTINUED | OUTPATIENT
Start: 2018-07-16 | End: 2018-07-16

## 2018-07-16 RX ORDER — TETANUS AND DIPHTHERIA TOXOIDS ADSORBED 2; 2 [LF]/.5ML; [LF]/.5ML
0.5 INJECTION INTRAMUSCULAR ONCE
Qty: 0 | Refills: 0 | Status: DISCONTINUED | OUTPATIENT
Start: 2018-07-16 | End: 2018-07-16

## 2018-07-16 RX ORDER — ONDANSETRON 8 MG/1
4 TABLET, FILM COATED ORAL ONCE
Qty: 0 | Refills: 0 | Status: DISCONTINUED | OUTPATIENT
Start: 2018-07-17 | End: 2018-07-17

## 2018-07-16 RX ORDER — SODIUM CHLORIDE 9 MG/ML
2000 INJECTION INTRAMUSCULAR; INTRAVENOUS; SUBCUTANEOUS
Qty: 0 | Refills: 0 | Status: COMPLETED | OUTPATIENT
Start: 2018-07-16 | End: 2018-07-16

## 2018-07-16 RX ORDER — SODIUM CHLORIDE 9 MG/ML
1000 INJECTION, SOLUTION INTRAVENOUS
Qty: 0 | Refills: 0 | Status: DISCONTINUED | OUTPATIENT
Start: 2018-07-16 | End: 2018-07-17

## 2018-07-16 RX ADMIN — Medication 250 MILLIGRAM(S): at 12:47

## 2018-07-16 RX ADMIN — Medication 650 MILLIGRAM(S): at 12:00

## 2018-07-16 RX ADMIN — SODIUM CHLORIDE 1000 MILLILITER(S): 9 INJECTION INTRAMUSCULAR; INTRAVENOUS; SUBCUTANEOUS at 12:47

## 2018-07-16 RX ADMIN — Medication 650 MILLIGRAM(S): at 18:44

## 2018-07-16 RX ADMIN — TETANUS TOXOID, REDUCED DIPHTHERIA TOXOID AND ACELLULAR PERTUSSIS VACCINE, ADSORBED 0.5 MILLILITER(S): 5; 2.5; 8; 8; 2.5 SUSPENSION INTRAMUSCULAR at 18:44

## 2018-07-16 RX ADMIN — CEFEPIME 100 MILLIGRAM(S): 1 INJECTION, POWDER, FOR SOLUTION INTRAMUSCULAR; INTRAVENOUS at 15:59

## 2018-07-16 NOTE — H&P ADULT - ATTENDING COMMENTS
67M reports blunt trauma to left lower extremity two weeks ago resulting in area of contusion with progressive cellulitis and was seen at urgent care center where they prescribed him some oral antibiotics. However, he began to spike high fevers at home starting sunday with associated chills. Had otherwise been tolerating diet. Cellulitis of left leg persisted but did not necessarily progress. Denies pain of the left lower extremity currently. Denies sick contacts or recent travel.     PMHX: CAD  Meds: effient, ASA, BB  ALL: NKDA    FEbrile to 103, otherwise vitals stable  NAD  CTAB  RRR  Abdomen is obese but soft, Nontender  LLE with area of fluctance of left shin with mild overlying erythema, palpale distal pulses. No crepitus.    no leukocytosis  US shows collection of anterior shin    A/P Recent blunt trauma to LLE with recent treatment for cellulitis now presenting with fevers  - blood cultures obtained  - discussed with patient incision/drainage/debridement of LLE wound. simple abscess unlikely to cause significant fever of 103. Plan to evaluate fascia and ensure no evidence of necrotizing fasciitis

## 2018-07-16 NOTE — ED ADULT NURSE NOTE - OBJECTIVE STATEMENT
Patient states that he has been having increased discomfort in his left lower leg, patient states that he was recently placed on oral abx for a cellulitis in his leg but he started having fevers at home. 67 year old male A&OX4 pmhx of HTN, HLD, presents with left lower extremity swelling, redness, and pain. Patient states that last week he hit his left lower extremity. Patient states that a couple of days later he noticed swelling and discomfort. Patient presented to an urgent care facility and was prescribed PO Keflex. Patient was instructed that if he were to develop a fever, to come to the emergency room. Patient states this morning he noticed a fever and presented to the emergency room. Patient's leg on exam appears swollen, erythematous. Patient denies difficulty ambulating on the extremity, nausea, vomiting, dizziness, weakness, shortness of breath, chest pain, palpitations, diaphoresis.

## 2018-07-16 NOTE — ED ADULT TRIAGE NOTE - CHIEF COMPLAINT QUOTE
Pt c/o left leg cellulitis, dx last week and placed on ABX (keflex) and (sulfamethoxazole), reports developed "fever and chills." Pt did not take temp. Denies any other complaints at this time.

## 2018-07-16 NOTE — H&P ADULT - MS EXT PE MLT D E PC
raised area pretibial 5x4cm associated with erythema and flucutance. no open wound or drainage/pedal edema

## 2018-07-16 NOTE — CONSULT NOTE ADULT - SUBJECTIVE AND OBJECTIVE BOX
HPI: Patient is a 67y old  Male who presents with a chief complaint of pain and swelling of his left leg. He reports hitting his leg with a lead pipe weeks ago. This was followed by redness and increased swelling, which then improved after antibiotic Tx. More recently developed a fever.   Last PCI with SARA was Sept 2017. Denies current or recent chest pain.   He is scheduled for surgical drainage of LE abscess vs hyematoma.    [facility] Sept 2017   EF estimated was 55 %.  VALVES: MITRAL VALVE: The mitral valve exhibited mild regurgitation.  CORONARY VESSELS: The coronary circulation is right dominant.  LM:   --  LM: Angiography showed mild atherosclerosis with no flow limiting  lesions.  LAD:   --  Proximal LAD: There was a tubular 90 % stenosis. The lesion was  moderately calcified.  --  Mid LAD: There was a diffuse 80 % stenosis.  CX:   --  Circumflex: Angiography showed minor luminal irregularities with  no flow limiting lesions.  RI:   --  Proximal ramus intermedius: Angiography showed mild  atherosclerosis with no flow limiting lesions.  RCA:   --  Proximal RCA: There was a discrete 20 % stenosis. The lesion was  eccentric.  --  RPDA: There was a tubular 90 % stenosis at the ostium of the vessel  segment.  --  RPLS: The distal vessel was supplied by moderate collaterals from  septal branches of LAD and the distal circumflex. There was a discrete 100  % stenosis.  COMPLICATIONS: No complications occurred during the cath lab visit.  SUMMARY:  1ST LESION INTERVENTIONS: A successful drug-eluting stent was performed on  the 90 % lesion in the proximal LAD. Following intervention there was an  excellent angiographic appearance with a 0 % residual stenosis.  2ND LESION INTERVENTIONS: A successful drug-eluting stent was performed on  the 80 % lesion in the mid LAD. Following intervention there was an  excellent angiographic appearance with a 0 % residual stenosis.      PAST MEDICAL & SURGICAL HISTORY:  CAD (coronary artery disease)  Urethral disorder: urine retention likely secondary to urethral stricture  ETOH abuse  Chest pain  Obesity  HTN (hypertension)  HLD (hyperlipidemia)  Cardiomyopathy  Mild aortic insufficiency  Acute urethral obstruction: surgical procedure to relieve obstruction      Allergies    No Known Allergies    Intolerances        MEDICATIONS  (STANDING):  piperacillin/tazobactam IVPB. 3.375 Gram(s) IV Intermittent every 8 hours  vancomycin  IVPB 1000 milliGRAM(s) IV Intermittent every 12 hours    MEDICATIONS  (PRN):      FAMILY HISTORY:  Family history of CVA      SOCIAL HISTORY:    CIGARETTES: denies    ALCOHOL: social    REVIEW OF SYSTEMS:  CONSTITUTIONAL: No fever, weight loss, or fatigue  EYES: No eye pain, visual disturbances, or discharge  ENMT:  No difficulty hearing, tinnitus, vertigo; No sinus or throat pain  NECK: No pain or stiffness  RESPIRATORY: No cough, wheezing, chills or hemoptysis; No Shortness of Breath  CARDIOVASCULAR: No chest pain, palpitations, passing out, dizziness, or leg swelling  GASTROINTESTINAL: No abdominal or epigastric pain. No nausea, vomiting, or hematemesis; No diarrhea or constipation. No melena or hematochezia.  GENITOURINARY: No dysuria, frequency, hematuria, or incontinence  NEUROLOGICAL: No headaches, memory loss, loss of strength, numbness, or tremors  SKIN: No itching, burning, rashes, or lesions   LYMPH Nodes: No enlarged glands  ENDOCRINE: No heat or cold intolerance; No hair loss  MUSCULOSKELETAL: No joint pain or swelling; No muscle, back, + extremity pain  PSYCHIATRIC: No depression, anxiety, mood swings, or difficulty sleeping  HEME/LYMPH: No easy bruising, or bleeding gums  ALLERY AND IMMUNOLOGIC: No hives or eczema	    Vital Signs Last 24 Hrs  T(C): 37.9 (16 Jul 2018 16:35), Max: 39.5 (16 Jul 2018 09:46)  T(F): 100.3 (16 Jul 2018 16:35), Max: 103.1 (16 Jul 2018 09:46)  HR: 87 (16 Jul 2018 16:44) (87 - 89)  BP: 132/74 (16 Jul 2018 16:44) (132/74 - 155/80)  BP(mean): --  RR: 20 (16 Jul 2018 16:44) (20 - 20)  SpO2: 94% (16 Jul 2018 16:44) (94% - 95%)    Daily Height in cm: 182.88 (16 Jul 2018 09:46)    Daily     I&O's Detail      PHYSICAL EXAM:  Appearance: Normal, well nourished	  HEENT:   Normal oral mucosa, PERRL, EOMI, sclera non-icteric	  Lymphatic: No cervical lymphadenopathy  Cardiovascular: Normal S1 S2, No JVD, No cardiac murmurs, No carotid bruits, No peripheral edema  Respiratory: Lungs clear to auscultation	  Psychiatry: A & O x 3, Mood & affect appropriate  Gastrointestinal:  Soft, Non-tender, + BS, no bruits	  Skin: No rashes, No ecchymoses, No cyanosis  Neurologic: Grossly non-focal with full strength in all four extremities  Extremities: Normal range of motion, No clubbing, cyanosis or edema  Vascular: Peripheral pulses palpable 2+ bilaterally        LABS:                        14.8   6.2   )-----------( 145      ( 16 Jul 2018 13:04 )             44.7     07-16    135  |  98  |  23.0<H>  ----------------------------<  112  4.7   |  22.0  |  1.04    Ca    9.0      16 Jul 2018 13:04    TPro  6.6  /  Alb  3.6  /  TBili  0.6  /  DBili  x   /  AST  29  /  ALT  24  /  AlkPhos  56  07-16    CARDIAC MARKERS ( 16 Jul 2018 13:04 )  x     / x     / 78 U/L / x     / x              I&O's Summary    BNP  RADIOLOGY & ADDITIONAL STUDIES:

## 2018-07-16 NOTE — ED PROVIDER NOTE - SKIN, MLM
Skin normal color for race, dry and intact. No evidence of rash. Febrile of 103.1. Mid L lower distal extremity has 4 cm area of erythema, and edema with fluctuance. Mild tenderness to palpation.

## 2018-07-16 NOTE — H&P ADULT - ASSESSMENT
67 year old male presenting with left lower extremity redness, swelling to discrete area pretibial associated with high grade fevers despite oral antibiotics.

## 2018-07-16 NOTE — ED PROVIDER NOTE - PMH
CAD (coronary artery disease)    Cardiomyopathy    Chest pain    ETOH abuse    HLD (hyperlipidemia)    HTN (hypertension)    Mild aortic insufficiency    Obesity    Urethral disorder  urine retention likely secondary to urethral stricture

## 2018-07-16 NOTE — H&P ADULT - HISTORY OF PRESENT ILLNESS
67 year old male presents to ED due to one day history of fevers. 2 weeks ago, lead pipe fell on his left lower extremity without breaking any skin or causing an open wound. patient went to urgent care and received oral antibiotics which he is still taking (keflex and bactrim). At the time of the incident there was redness and diffuse swelling to the left lower extremity. This swelling and redness had significantly improved per patient. However, there was still a raised area associated with redness in the mid shaft region of left tibia. Patient noticed increased urinary frequency yesterday along with high fevers and decreased appetite which prompted him to come to ED today. Denies vomiting, chest pain, shortness of breath, abdominal pain, dysuria, difficulty ambulating, weakness or numbness in lower extremities

## 2018-07-16 NOTE — BRIEF OPERATIVE NOTE - PROCEDURE
<<-----Click on this checkbox to enter Procedure Incision and drainage abscess  07/16/2018    Active  [id number]

## 2018-07-16 NOTE — ED STATDOCS - PROGRESS NOTE DETAILS
temp 103.2 has infection to left leg will probably need admission since he has been on keflex and bactrim for one week lactate cultures pending ultrasound of leg pending will transfer to [facility].

## 2018-07-16 NOTE — H&P ADULT - PSH
Acute urethral obstruction  surgical procedure to relieve obstruction  S/P coronary artery stent placement

## 2018-07-16 NOTE — CONSULT NOTE ADULT - ASSESSMENT
1. Preop cardiovascular assessment: There is no contraindication to proceed as scheduled today with this nonelective low risk procedure. Continue antiplatelet therapy including ASA and Effient perioperatively.   2. Stable. S/p PCI with SARA to prox and mid LAD 8/17, then RPDA 9/17. EF 55%. No sign of ACS. Details as described above.

## 2018-07-16 NOTE — H&P ADULT - PROBLEM SELECTOR PLAN 1
-admit to ACS service  -OR for incision and debridement of left lower extremity wound  -NPO/IVF  -IV antibiotics with vanc/zosyn  -tetanus immunization  -dvt ppx

## 2018-07-16 NOTE — BRIEF OPERATIVE NOTE - OPERATION/FINDINGS
Incision and drainage performed on anterior aspect of the left lower leg. Low amounts of drainage extracted and closed up incision with vertical mattress sutures.

## 2018-07-16 NOTE — ED PROVIDER NOTE - OBJECTIVE STATEMENT
68 y/o M pt with medical hx CAD, HTN, HLD, obesity and social hx of EtOH abuse presents to ED c/o L lower distal leg cellulitis. Pt reports that he had a leg injury, with a led pipe 2 weeks ago, and he developed a second cellulitis after the injury. He has been on Keflex and Vactrum, for his infection. Pt however, in the past 2 hours developed a fever along with chills. Pt states that his L leg is still swollen, and warm. Pt has no other symptoms he states, and otherwise has been fine. No further complaints at this time.

## 2018-07-17 ENCOUNTER — TRANSCRIPTION ENCOUNTER (OUTPATIENT)
Age: 68
End: 2018-07-17

## 2018-07-17 VITALS
SYSTOLIC BLOOD PRESSURE: 150 MMHG | DIASTOLIC BLOOD PRESSURE: 83 MMHG | TEMPERATURE: 99 F | HEART RATE: 67 BPM | OXYGEN SATURATION: 93 %

## 2018-07-17 DIAGNOSIS — Z01.810 ENCOUNTER FOR PREPROCEDURAL CARDIOVASCULAR EXAMINATION: ICD-10-CM

## 2018-07-17 PROBLEM — Z00.00 ENCOUNTER FOR PREVENTIVE HEALTH EXAMINATION: Status: ACTIVE | Noted: 2018-07-17

## 2018-07-17 LAB
ANION GAP SERPL CALC-SCNC: 14 MMOL/L — SIGNIFICANT CHANGE UP (ref 5–17)
ANISOCYTOSIS BLD QL: SLIGHT — SIGNIFICANT CHANGE UP
APPEARANCE UR: CLEAR — SIGNIFICANT CHANGE UP
BACTERIA # UR AUTO: ABNORMAL
BILIRUB UR-MCNC: NEGATIVE — SIGNIFICANT CHANGE UP
BUN SERPL-MCNC: 20 MG/DL — SIGNIFICANT CHANGE UP (ref 8–20)
CALCIUM SERPL-MCNC: 8.4 MG/DL — LOW (ref 8.6–10.2)
CHLORIDE SERPL-SCNC: 100 MMOL/L — SIGNIFICANT CHANGE UP (ref 98–107)
CO2 SERPL-SCNC: 23 MMOL/L — SIGNIFICANT CHANGE UP (ref 22–29)
COLOR SPEC: YELLOW — SIGNIFICANT CHANGE UP
CREAT SERPL-MCNC: 1.06 MG/DL — SIGNIFICANT CHANGE UP (ref 0.5–1.3)
DIFF PNL FLD: ABNORMAL
EPI CELLS # UR: SIGNIFICANT CHANGE UP
GLUCOSE SERPL-MCNC: 215 MG/DL — HIGH (ref 70–115)
GLUCOSE UR QL: NEGATIVE MG/DL — SIGNIFICANT CHANGE UP
GRAM STN FLD: SIGNIFICANT CHANGE UP
HCT VFR BLD CALC: 42.1 % — SIGNIFICANT CHANGE UP (ref 42–52)
HGB BLD-MCNC: 13.7 G/DL — LOW (ref 14–18)
KETONES UR-MCNC: NEGATIVE — SIGNIFICANT CHANGE UP
LEUKOCYTE ESTERASE UR-ACNC: NEGATIVE — SIGNIFICANT CHANGE UP
LYMPHOCYTES # BLD AUTO: 6 % — LOW (ref 20–55)
MAGNESIUM SERPL-MCNC: 1.7 MG/DL — SIGNIFICANT CHANGE UP (ref 1.6–2.6)
MCHC RBC-ENTMCNC: 30.3 PG — SIGNIFICANT CHANGE UP (ref 27–31)
MCHC RBC-ENTMCNC: 32.5 G/DL — SIGNIFICANT CHANGE UP (ref 32–36)
MCV RBC AUTO: 93.1 FL — SIGNIFICANT CHANGE UP (ref 80–94)
MONOCYTES NFR BLD AUTO: 2 % — LOW (ref 3–10)
NEUTROPHILS NFR BLD AUTO: 88 % — HIGH (ref 37–73)
NEUTS BAND # BLD: 4 % — SIGNIFICANT CHANGE UP (ref 0–8)
NITRITE UR-MCNC: NEGATIVE — SIGNIFICANT CHANGE UP
OVALOCYTES BLD QL SMEAR: SLIGHT — SIGNIFICANT CHANGE UP
PH UR: 6 — SIGNIFICANT CHANGE UP (ref 5–8)
PHOSPHATE SERPL-MCNC: 3.2 MG/DL — SIGNIFICANT CHANGE UP (ref 2.4–4.7)
PLAT MORPH BLD: NORMAL — SIGNIFICANT CHANGE UP
PLATELET # BLD AUTO: 127 K/UL — LOW (ref 150–400)
POIKILOCYTOSIS BLD QL AUTO: SLIGHT — SIGNIFICANT CHANGE UP
POTASSIUM SERPL-MCNC: 4.5 MMOL/L — SIGNIFICANT CHANGE UP (ref 3.5–5.3)
POTASSIUM SERPL-SCNC: 4.5 MMOL/L — SIGNIFICANT CHANGE UP (ref 3.5–5.3)
PROT UR-MCNC: 15 MG/DL
RBC # BLD: 4.52 M/UL — LOW (ref 4.6–6.2)
RBC # FLD: 13.1 % — SIGNIFICANT CHANGE UP (ref 11–15.6)
RBC BLD AUTO: ABNORMAL
RBC CASTS # UR COMP ASSIST: SIGNIFICANT CHANGE UP /HPF (ref 0–4)
SODIUM SERPL-SCNC: 137 MMOL/L — SIGNIFICANT CHANGE UP (ref 135–145)
SP GR SPEC: 1.02 — SIGNIFICANT CHANGE UP (ref 1.01–1.02)
SPECIMEN SOURCE: SIGNIFICANT CHANGE UP
UROBILINOGEN FLD QL: NEGATIVE MG/DL — SIGNIFICANT CHANGE UP
WBC # BLD: 5.2 K/UL — SIGNIFICANT CHANGE UP (ref 4.8–10.8)
WBC # FLD AUTO: 5.2 K/UL — SIGNIFICANT CHANGE UP (ref 4.8–10.8)
WBC UR QL: SIGNIFICANT CHANGE UP

## 2018-07-17 PROCEDURE — 80053 COMPREHEN METABOLIC PANEL: CPT

## 2018-07-17 PROCEDURE — 71046 X-RAY EXAM CHEST 2 VIEWS: CPT

## 2018-07-17 PROCEDURE — 84100 ASSAY OF PHOSPHORUS: CPT

## 2018-07-17 PROCEDURE — 80048 BASIC METABOLIC PNL TOTAL CA: CPT

## 2018-07-17 PROCEDURE — 86901 BLOOD TYPING SEROLOGIC RH(D): CPT

## 2018-07-17 PROCEDURE — 96374 THER/PROPH/DIAG INJ IV PUSH: CPT

## 2018-07-17 PROCEDURE — 86850 RBC ANTIBODY SCREEN: CPT

## 2018-07-17 PROCEDURE — 87075 CULTR BACTERIA EXCEPT BLOOD: CPT

## 2018-07-17 PROCEDURE — 27603 DRAIN LOWER LEG LESION: CPT

## 2018-07-17 PROCEDURE — 93005 ELECTROCARDIOGRAM TRACING: CPT

## 2018-07-17 PROCEDURE — 82550 ASSAY OF CK (CPK): CPT

## 2018-07-17 PROCEDURE — 90715 TDAP VACCINE 7 YRS/> IM: CPT

## 2018-07-17 PROCEDURE — 84145 PROCALCITONIN (PCT): CPT

## 2018-07-17 PROCEDURE — 81001 URINALYSIS AUTO W/SCOPE: CPT

## 2018-07-17 PROCEDURE — 36415 COLL VENOUS BLD VENIPUNCTURE: CPT

## 2018-07-17 PROCEDURE — 85027 COMPLETE CBC AUTOMATED: CPT

## 2018-07-17 PROCEDURE — 86900 BLOOD TYPING SEROLOGIC ABO: CPT

## 2018-07-17 PROCEDURE — 93971 EXTREMITY STUDY: CPT

## 2018-07-17 PROCEDURE — 93010 ELECTROCARDIOGRAM REPORT: CPT

## 2018-07-17 PROCEDURE — 83605 ASSAY OF LACTIC ACID: CPT

## 2018-07-17 PROCEDURE — 87070 CULTURE OTHR SPECIMN AEROBIC: CPT

## 2018-07-17 PROCEDURE — 83735 ASSAY OF MAGNESIUM: CPT

## 2018-07-17 PROCEDURE — 73590 X-RAY EXAM OF LOWER LEG: CPT

## 2018-07-17 PROCEDURE — 99285 EMERGENCY DEPT VISIT HI MDM: CPT | Mod: 25

## 2018-07-17 PROCEDURE — 96375 TX/PRO/DX INJ NEW DRUG ADDON: CPT

## 2018-07-17 PROCEDURE — 93306 TTE W/DOPPLER COMPLETE: CPT | Mod: 26

## 2018-07-17 PROCEDURE — 93306 TTE W/DOPPLER COMPLETE: CPT

## 2018-07-17 PROCEDURE — 76882 US LMTD JT/FCL EVL NVASC XTR: CPT

## 2018-07-17 PROCEDURE — 87086 URINE CULTURE/COLONY COUNT: CPT

## 2018-07-17 PROCEDURE — 87040 BLOOD CULTURE FOR BACTERIA: CPT

## 2018-07-17 RX ORDER — ASPIRIN/CALCIUM CARB/MAGNESIUM 324 MG
1 TABLET ORAL
Qty: 0 | Refills: 0 | COMMUNITY
Start: 2018-07-17

## 2018-07-17 RX ORDER — LISINOPRIL 2.5 MG/1
1 TABLET ORAL
Qty: 0 | Refills: 0 | COMMUNITY
Start: 2018-07-17

## 2018-07-17 RX ORDER — LISINOPRIL 2.5 MG/1
1 TABLET ORAL
Qty: 0 | Refills: 0 | COMMUNITY

## 2018-07-17 RX ORDER — AMLODIPINE BESYLATE 2.5 MG/1
5 TABLET ORAL DAILY
Qty: 0 | Refills: 0 | Status: DISCONTINUED | OUTPATIENT
Start: 2018-07-17 | End: 2018-07-17

## 2018-07-17 RX ORDER — LISINOPRIL 2.5 MG/1
40 TABLET ORAL DAILY
Qty: 0 | Refills: 0 | Status: DISCONTINUED | OUTPATIENT
Start: 2018-07-17 | End: 2018-07-17

## 2018-07-17 RX ORDER — METOPROLOL TARTRATE 50 MG
12.5 TABLET ORAL
Qty: 0 | Refills: 0 | Status: DISCONTINUED | OUTPATIENT
Start: 2018-07-17 | End: 2018-07-17

## 2018-07-17 RX ORDER — PRASUGREL 5 MG/1
10 TABLET, FILM COATED ORAL DAILY
Qty: 0 | Refills: 0 | Status: DISCONTINUED | OUTPATIENT
Start: 2018-07-17 | End: 2018-07-17

## 2018-07-17 RX ORDER — ASPIRIN/CALCIUM CARB/MAGNESIUM 324 MG
325 TABLET ORAL DAILY
Qty: 0 | Refills: 0 | Status: DISCONTINUED | OUTPATIENT
Start: 2018-07-17 | End: 2018-07-17

## 2018-07-17 RX ORDER — MAGNESIUM SULFATE 500 MG/ML
2 VIAL (ML) INJECTION ONCE
Qty: 0 | Refills: 0 | Status: COMPLETED | OUTPATIENT
Start: 2018-07-17 | End: 2018-07-17

## 2018-07-17 RX ORDER — ASPIRIN/CALCIUM CARB/MAGNESIUM 324 MG
325 TABLET ORAL
Qty: 0 | Refills: 0 | COMMUNITY

## 2018-07-17 RX ORDER — ATORVASTATIN CALCIUM 80 MG/1
20 TABLET, FILM COATED ORAL AT BEDTIME
Qty: 0 | Refills: 0 | Status: DISCONTINUED | OUTPATIENT
Start: 2018-07-17 | End: 2018-07-17

## 2018-07-17 RX ADMIN — Medication 12.5 MILLIGRAM(S): at 07:59

## 2018-07-17 RX ADMIN — Medication 12.5 MILLIGRAM(S): at 18:32

## 2018-07-17 RX ADMIN — Medication 300 MILLIGRAM(S): at 02:00

## 2018-07-17 RX ADMIN — Medication 325 MILLIGRAM(S): at 13:03

## 2018-07-17 RX ADMIN — PIPERACILLIN AND TAZOBACTAM 25 GRAM(S): 4; .5 INJECTION, POWDER, LYOPHILIZED, FOR SOLUTION INTRAVENOUS at 11:09

## 2018-07-17 RX ADMIN — LISINOPRIL 40 MILLIGRAM(S): 2.5 TABLET ORAL at 13:03

## 2018-07-17 RX ADMIN — Medication 50 GRAM(S): at 09:09

## 2018-07-17 RX ADMIN — PRASUGREL 10 MILLIGRAM(S): 5 TABLET, FILM COATED ORAL at 13:09

## 2018-07-17 RX ADMIN — AMLODIPINE BESYLATE 5 MILLIGRAM(S): 2.5 TABLET ORAL at 05:55

## 2018-07-17 RX ADMIN — ENOXAPARIN SODIUM 40 MILLIGRAM(S): 100 INJECTION SUBCUTANEOUS at 06:16

## 2018-07-17 RX ADMIN — SODIUM CHLORIDE 100 MILLILITER(S): 9 INJECTION, SOLUTION INTRAVENOUS at 04:10

## 2018-07-17 RX ADMIN — PIPERACILLIN AND TAZOBACTAM 25 GRAM(S): 4; .5 INJECTION, POWDER, LYOPHILIZED, FOR SOLUTION INTRAVENOUS at 03:15

## 2018-07-17 NOTE — PROGRESS NOTE ADULT - PROBLEM SELECTOR PLAN 1
-pain control  -encourage IS use  -heart healthy diet  -restart home medications  -take down dressing in 2 days  -IV antibiotics  -follow up blood cultures, wound cultures

## 2018-07-17 NOTE — PROGRESS NOTE ADULT - ATTENDING COMMENTS
Feels much better.  Only low grade temp.  LLE incision is clean and there is no cellulitis.  Has been receiving Zosyn.  Will convert to Augmentin.  Have told him to stop the Keflex and Bactrim.  Will f/u blood and wound cultures.  To keep LLE elevated when supine.  May f/u in ACS office this Thursday and we will f/u the cultures and tailor abx appropriately.

## 2018-07-17 NOTE — PROGRESS NOTE ADULT - PROBLEM SELECTOR PLAN 1
1. Patient cleared from a cardiac perspective for this non-elective, low-risk procedure  2. Continue with DAPT perioperatively  3. Cardiology will follow patient post-op

## 2018-07-17 NOTE — DISCHARGE NOTE ADULT - PATIENT PORTAL LINK FT
You can access the [facility] Patient Portal, offered by [facility], by registering with the following website: [email or web address]

## 2018-07-17 NOTE — DISCHARGE NOTE ADULT - HOSPITAL COURSE
67 year old male presents to ED due to one day history of fevers. 2 weeks ago, lead pipe fell on his left lower extremity without breaking any skin or causing an open wound. patient went to urgent care and received oral antibiotics which he is still taking (keflex and bactrim). At the time of the incident there was redness and diffuse swelling to the left lower extremity. This swelling and redness had significantly improved per patient. However, there was still a raised area associated with redness in the mid shaft region of left tibia. Patient noticed increased urinary frequency yesterday along with high fevers and decreased appetite which prompted him to come to ED today. Denies vomiting, chest pain, shortness of breath, abdominal pain, dysuria, difficulty ambulating, weakness or numbness in lower extremities.    Hospital Course: Tibfib XR on admission negative. LLE duplex showed no evidence of left lower extremity deep venous thrombosis. Soft tissue US of LLE showed complex fluid collection. Patient was admitted to the ACS service & taken to the OR on 7/16 for I&D of LLE. Intra-op cultures sent. Pt tolerated procedure well. Post-op incision is C/D/I with no surrounding erythema or drainage, edema of LLE has improved significantly. Pt remains afebrile, no leukocytosis, and will be d/c'ed home on course of PO augmentin with the understanding that he is to f/u at the ACS clinic in 2 days so that culture results can be reviewed and abx changed, if necessary. Pt expressed verbal understanding of plan. At time of d/c pt remains hemodynamically well, tolerating diet, pain controlled, OOB ambulating, voiding.    Patient is advised to RETURN TO THE EMERGENCY DEPARTMENT for any of the following - worsening pain, fever/chills, nausea/vomiting, altered mental status, chest pain, shortness of breath, or any other new / worsening symptom.

## 2018-07-17 NOTE — DISCHARGE NOTE ADULT - PROVIDER TOKENS
FREE:[LAST:[[facility]],PHONE:[phone]],FAX:[(   [phone]    -],ADDRESS:[[address] [place] [address] - [address] [place] [address]]]

## 2018-07-17 NOTE — DISCHARGE NOTE ADULT - CARE PLAN
Principal Discharge DX:	Cellulitis and abscess of left lower extremity  Goal:	Alleviation of pain and symptoms  Assessment and plan of treatment:	Follow up: Please call and make an appointment with the [facility] for THIS RIO 7/19/18. Also, please call and make an appointment with your primary care physician as per your usual schedule.     Activity: May return to normal activities as tolerated. Keep left lower extremity elevated when you are sitting / lying down.    Diet: May continue regular diet.    Medications: Please take all home medications as prescribed by your primary care doctor. A course of oral antibiotics (AUGMENTIN) has been sent to your pharmacy - please take as prescribed; do not skip doses, take with food to avoid stomach upset. Tylenol for pain relief, as needed.    Wound Care: Please, keep wound site clean and dry. You may shower, but do not bathe.    Patient is advised to RETURN TO THE EMERGENCY DEPARTMENT for any of the following - worsening pain, fever/chills, nausea/vomiting, altered mental status, chest pain, shortness of breath, or any other new / worsening symptom.  Secondary Diagnosis:	HTN (hypertension)  Assessment and plan of treatment:	Please resume all home blood pressure medications at current doses, monitor blood pressure at home, and follow-up with your cardiologist and/or your primary care provider as per your usual schedule.  Secondary Diagnosis:	CAD (coronary artery disease)

## 2018-07-17 NOTE — DISCHARGE NOTE ADULT - MEDICATION SUMMARY - MEDICATIONS TO TAKE
I will START or STAY ON the medications listed below when I get home from the hospital:    aspirin 325 mg oral tablet  -- 1 tab(s) by mouth once a day  -- Indication: For Home medication    lisinopril 40 mg oral tablet  -- 1 tab(s) by mouth once a day  -- Indication: For Home medication    atorvastatin 20 mg oral tablet  -- 1 tab(s) by mouth once a day (at bedtime)  -- Indication: For Home medication    prasugrel 10 mg oral tablet  -- 1 tab(s) by mouth once a day  -- Indication: For Home medication    metoprolol tartrate 25 mg oral tablet  -- 0.5 tab(s) by mouth 2 times a day   -- Indication: For Home medication    Norvasc 5 mg oral tablet  -- 1 tab(s) by mouth once a day   -- It is very important that you take or use this exactly as directed.  Do not skip doses or discontinue unless directed by your doctor.  Some non-prescription drugs may aggravate your condition.  Read all labels carefully.  If a warning appears, check with your doctor before taking.    -- Indication: For Home medication    Augmentin 875 mg-125 mg oral tablet  -- 1 tab(s) by mouth 2 times a day   -- Finish all this medication unless otherwise directed by prescriber.  Take with food or milk.    -- Indication: For Soft tissue infection

## 2018-07-17 NOTE — PROGRESS NOTE ADULT - ASSESSMENT
66 yo M w/ /h/o CAD/PCI (SARA to prox, mid-LAD in 08/2017, PCI to RPDA 09/2017; LVEF ~55%) who p/w LE abscess vs hematoma and is scheduled for surgical drainage.

## 2018-07-17 NOTE — DISCHARGE NOTE ADULT - CARE PROVIDER_API CALL
[facility],   [facility] [address] - [address] [place] [address]  Phone: [phone]  Fax: (   )    -

## 2018-07-17 NOTE — DISCHARGE NOTE ADULT - PLAN OF CARE
Alleviation of pain and symptoms Follow up: Please call and make an appointment with the [facility] for THIS RIO 7/19/18. Also, please call and make an appointment with your primary care physician as per your usual schedule.     Activity: May return to normal activities as tolerated. Keep left lower extremity elevated when you are sitting / lying down.    Diet: May continue regular diet.    Medications: Please take all home medications as prescribed by your primary care doctor. A course of oral antibiotics (AUGMENTIN) has been sent to your pharmacy - please take as prescribed; do not skip doses, take with food to avoid stomach upset. Tylenol for pain relief, as needed.    Wound Care: Please, keep wound site clean and dry. You may shower, but do not bathe.    Patient is advised to RETURN TO THE EMERGENCY DEPARTMENT for any of the following - worsening pain, fever/chills, nausea/vomiting, altered mental status, chest pain, shortness of breath, or any other new / worsening symptom. Please resume all home blood pressure medications at current doses, monitor blood pressure at home, and follow-up with your cardiologist and/or your primary care provider as per your usual schedule.

## 2018-07-18 LAB
CULTURE RESULTS: NO GROWTH — SIGNIFICANT CHANGE UP
SPECIMEN SOURCE: SIGNIFICANT CHANGE UP

## 2018-07-18 NOTE — PROGRESS NOTE ADULT - SUBJECTIVE AND OBJECTIVE BOX
INTERVAL HPI/OVERNIGHT EVENTS: No acute events overnight. Pain well controlled. Denies fevers, chills, nausea, vomiting, chest pain, shortness of breath. No weakness or decreased sensation to bilateral lower extremities. Voiding on his own appropriately and ambulating independently.     STATUS POST:  left lower extremity, incision and drainage     POST OPERATIVE DAY #: 1      MEDICATIONS  (STANDING):  amLODIPine   Tablet 5 milliGRAM(s) Oral daily  aspirin 325 milliGRAM(s) Oral daily  atorvastatin 20 milliGRAM(s) Oral at bedtime  enoxaparin Injectable 40 milliGRAM(s) SubCutaneous every 24 hours  lactated ringers. 1000 milliLiter(s) (100 mL/Hr) IV Continuous <Continuous>  lisinopril 40 milliGRAM(s) Oral daily  metoprolol tartrate Oral Tab/Cap - Peds 12.5 milliGRAM(s) Oral two times a day  piperacillin/tazobactam IVPB. 3.375 Gram(s) IV Intermittent every 8 hours  prasugrel 10 milliGRAM(s) Oral daily    MEDICATIONS  (PRN):  fentaNYL    Injectable 25 MICROGram(s) IV Push every 5 minutes PRN Moderate Pain  HYDROmorphone  Injectable 0.5 milliGRAM(s) IV Push every 10 minutes PRN Severe Pain (7 - 10)  ondansetron Injectable 4 milliGRAM(s) IV Push once PRN Nausea and/or Vomiting      Vital Signs Last 24 Hrs  T(C): 37.2 (17 Jul 2018 07:50), Max: 39.5 (16 Jul 2018 09:46)  T(F): 99 (17 Jul 2018 07:50), Max: 103.1 (16 Jul 2018 09:46)  HR: 62 (17 Jul 2018 07:50) (54 - 99)  BP: 140/72 (17 Jul 2018 07:50) (101/59 - 172/97)  BP(mean): --  RR: 20 (17 Jul 2018 07:50) (14 - 20)  SpO2: 98% (17 Jul 2018 07:50) (94% - 98%)    Physical Exam:    Neurological:  No sensory/motor deficits    HEENT: PERRLA, EOMI, no drainage or redness    Neck: No bruits; no thyromegaly or nodules,  No JVD    Back: Normal spine flexure, No CVA tenderness, No deformity or limitation of movement    Respiratory: Breath Sounds equal & clear to auscultation, no accessory muscle use    Cardiovascular: Regular rate & rhythm, normal S1, S2; no murmurs, gallops or rubs    Gastrointestinal: Soft, non-tender, normal bowel sounds. yellowish bruises noted over epigastric region.     Extremities: LLE wrapped with ace wrap, no strike through on dressing. minimal edema note to toes of LLE. motor 5/5 and sensation grossly intact bilaterally. no open wounds noted between toes or on plantar aspect of feet. no signs of infection on upper extremities/hands bilaterally. left medial arm with large, purple ecchymosis area    Vascular: Equal and normal pulses: 2+ peripheral pulses throughout    Musculoskeletal: No joint pain, swelling or deformity; no limitation of movement    Skin: No rashes      I&O's Detail      LABS:                        13.7   5.2   )-----------( 127      ( 17 Jul 2018 03:58 )             42.1     07-17    137  |  100  |  20.0  ----------------------------<  215<H>  4.5   |  23.0  |  1.06    Ca    8.4<L>      17 Jul 2018 03:58  Phos  3.2     07-17  Mg     1.7     07-17    TPro  6.6  /  Alb  3.6  /  TBili  0.6  /  DBili  x   /  AST  29  /  ALT  24  /  AlkPhos  56  07-16          RADIOLOGY & ADDITIONAL STUDIES:
Patient is a 67y old  Male who presents with a chief complaint of left lower extremity swelling (16 Jul 2018 19:53)        PAST MEDICAL & SURGICAL HISTORY:  CAD (coronary artery disease)  Urethral disorder: urine retention likely secondary to urethral stricture  ETOH abuse  Chest pain  Obesity  HTN (hypertension)  HLD (hyperlipidemia)  Cardiomyopathy  Mild aortic insufficiency  S/P coronary artery stent placement  Acute urethral obstruction: surgical procedure to relieve obstruction        VENOUS DUPLEX SCAN:  FINDINGS:    CHEST CT PULMONARY ANGIO with IV Contrast:  FINDINGS:  MEDICATIONS  (STANDING):  amLODIPine   Tablet 5 milliGRAM(s) Oral daily  aspirin 325 milliGRAM(s) Oral daily  atorvastatin 20 milliGRAM(s) Oral at bedtime  enoxaparin Injectable 40 milliGRAM(s) SubCutaneous every 24 hours  lactated ringers. 1000 milliLiter(s) (100 mL/Hr) IV Continuous <Continuous>  metoprolol tartrate Oral Tab/Cap - Peds 12.5 milliGRAM(s) Oral two times a day  piperacillin/tazobactam IVPB. 3.375 Gram(s) IV Intermittent every 8 hours  prasugrel 10 milliGRAM(s) Oral daily    MEDICATIONS  (PRN):  fentaNYL    Injectable 25 MICROGram(s) IV Push every 5 minutes PRN Moderate Pain  HYDROmorphone  Injectable 0.5 milliGRAM(s) IV Push every 10 minutes PRN Severe Pain (7 - 10)  ondansetron Injectable 4 milliGRAM(s) IV Push once PRN Nausea and/or Vomiting    Vital Signs Last 24 Hrs  T(C): 37.2 (17 Jul 2018 07:50), Max: 39.5 (16 Jul 2018 09:46)  T(F): 99 (17 Jul 2018 07:50), Max: 103.1 (16 Jul 2018 09:46)  HR: 62 (17 Jul 2018 07:50) (54 - 99)  BP: 140/72 (17 Jul 2018 07:50) (101/59 - 172/97)  BP(mean): --  RR: 20 (17 Jul 2018 07:50) (14 - 20)  SpO2: 98% (17 Jul 2018 07:50) (94% - 98%)    PHYSICAL EXAM:    *pt in OR      I&O's Detail      LABS:                        13.7   5.2   )-----------( 127      ( 17 Jul 2018 03:58 )             42.1     07-17    137  |  100  |  20.0  ----------------------------<  215<H>  4.5   |  23.0  |  1.06    Ca    8.4<L>      17 Jul 2018 03:58  Phos  3.2     07-17  Mg     1.7     07-17    TPro  6.6  /  Alb  3.6  /  TBili  0.6  /  DBili  x   /  AST  29  /  ALT  24  /  AlkPhos  56  07-16    CARDIAC MARKERS ( 16 Jul 2018 13:04 )  x     / x     / 78 U/L / x     / x              BNP  I&O's Detail    Daily Height in cm: 182.88 (16 Jul 2018 09:46)    Daily     RADIOLOGY & ADDITIONAL STUDIES:
Patient is a 67y old  Male who presents with a chief complaint of left lower extremity swelling (2018 11:58)        PAST MEDICAL & SURGICAL HISTORY:  CAD (coronary artery disease)  Urethral disorder: urine retention likely secondary to urethral stricture  ETOH abuse  Chest pain  Obesity  HTN (hypertension)  HLD (hyperlipidemia)  Cardiomyopathy  Mild aortic insufficiency  S/P coronary artery stent placement  Acute urethral obstruction: surgical procedure to relieve obstruction      Summary of admission HPI:                PREVIOUS DIAGNOSTIC TESTING:      ECHO  FINDINGS:    STRESS  FINDINGS:    CATHETERIZATION  FINDINGS:    ELECTROPHYSIOLOGY STUDY  FINDINGS:    CAROTID ULTRASOUND:  FINDINGS    VENOUS DUPLEX SCAN:  FINDINGS:    CHEST CT PULMONARY ANGIO with IV Contrast:  FINDINGS:  MEDICATIONS  (STANDING):    MEDICATIONS  (PRN):      FAMILY HISTORY:  Family history of CVA (Father, Mother)      SOCIAL HISTORY:    CIGARETTES:    ALCOHOL:    REVIEW OF SYSTEMS:    CONSTITUTIONAL: No fever, weight loss, chills, shakes, or fatigue  EYES: No eye pain, visual disturbances, or discharge  ENMT:  No difficulty hearing, tinnitus, vertigo; No sinus or throat pain  NECK: No pain or stiffness  BREASTS: No pain, masses, or nipple discharge  RESPIRATORY: No cough, wheezing, hemoptysis, or shortness of breath  CARDIOVASCULAR: No chest pain, dyspnea, palpitations, dizziness, syncope, paroxysmal nocturnal dyspnea, orthopnea, or arm or leg swelling  GASTROINTESTINAL: No abdominal  or epigastric pain, nausea, vomiting, hematemesis, diarrhea, constipation, melena or bright red blood.  GENITOURINARY: No dysuria, nocturia, hematuria, or urinary incontinence  NEUROLOGICAL: No headaches, memory loss, slurred speech, limb weakness, loss of strength, numbness, or tremors  SKIN: No itching, burning, rashes, or lesions   LYMPH NODES: No enlarged glands  ENDOCRINE: No heat or cold intolerance, or hair loss  MUSCULOSKELETAL: No joint pain or swelling, muscle, back, or extremity pain  PSYCHIATRIC: No depression, anxiety, or difficulty sleeping  HEME/LYMPH: No easy bruising or bleeding gums  ALLERY AND IMMUNOLOGIC: No hives or rash.      Vital Signs Last 24 Hrs  T(C): 37.2 (2018 20:49), Max: 37.2 (2018 16:39)  T(F): 98.9 (2018 20:49), Max: 99 (2018 16:39)  HR: 67 (2018 20:49) (63 - 71)  BP: 150/83 (2018 20:49) (113/71 - 150/83)  BP(mean): --  RR: 18 (2018 11:48) (18 - 18)  SpO2: 93% (2018 20:49) (93% - 93%)    PHYSICAL EXAM:    GENERAL: In no apparent distress, well nourished, and hydrated.  HEAD:  Atraumatic, Normocephalic  EYES: EOMI, PERRLA, conjunctiva and sclera clear  ENMT: No tonsillar erythema, exudates, or enlargement; Moist mucous membranes, Good dentition, No lesions  NECK: Supple and normal thyroid.  No JVD or carotid bruit.  Carotid pulse is 2+ bilaterally.  HEART: Regular rate and rhythm; No murmurs, rubs, or gallops.  PULMONARY: Clear to auscultation and perfusion.  No rales, wheezing, or rhonchi bilaterally.  ABDOMEN: Soft, Nontender, Nondistended; Bowel sounds present  EXTREMITIES:  2+ Peripheral Pulses, No clubbing, cyanosis, or edema  LYMPH: No lymphadenopathy noted  NEUROLOGICAL: Grossly nonfocal          INTERPRETATION OF TELEMETRY:    ECG:    I&O's Detail      LABS:                        13.7   5.2   )-----------( 127      ( 2018 03:58 )             42.1     07-17    137  |  100  |  20.0  ----------------------------<  215<H>  4.5   |  23.0  |  1.06    Ca    8.4<L>      2018 03:58  Phos  3.2     07-17  Mg     1.7     07-17    TPro  6.6  /  Alb  3.6  /  TBili  0.6  /  DBili  x   /  AST  29  /  ALT  24  /  AlkPhos  56  07-16    CARDIAC MARKERS ( 2018 13:04 )  x     / x     / 78 U/L / x     / x            Urinalysis Basic - ( 2018 15:09 )    Color: Yellow / Appearance: Clear / S.020 / pH: x  Gluc: x / Ketone: Negative  / Bili: Negative / Urobili: Negative mg/dL   Blood: x / Protein: 15 mg/dL / Nitrite: Negative   Leuk Esterase: Negative / RBC: 0-2 /HPF / WBC 0-2   Sq Epi: x / Non Sq Epi: Occasional / Bacteria: Occasional      BNP  I&O's Detail    Daily     Daily     RADIOLOGY & ADDITIONAL STUDIES:
67y Male s/p I and D of lower extremity under GA on 7/16/18    T(C): 37.2 (07-17-18 @ 16:39), Max: 38.1 (07-16-18 @ 21:13)  HR: 66 (07-17-18 @ 16:39) (54 - 99)  BP: 113/71 (07-17-18 @ 16:39) (101/59 - 172/97)  RR: 18 (07-17-18 @ 11:48) (14 - 20)  SpO2: 98% (07-17-18 @ 09:00) (94% - 98%)  Wt(kg): --    Pt seen, doing well, no anesthesia complications or complaints noted or reported.   No Nausea  Pain well controlled

## 2018-07-19 ENCOUNTER — APPOINTMENT (OUTPATIENT)
Dept: TRAUMA SURGERY | Facility: CLINIC | Age: 68
End: 2018-07-19
Payer: MEDICARE

## 2018-07-19 VITALS
OXYGEN SATURATION: 97 % | TEMPERATURE: 98.2 F | SYSTOLIC BLOOD PRESSURE: 137 MMHG | HEART RATE: 58 BPM | DIASTOLIC BLOOD PRESSURE: 84 MMHG

## 2018-07-19 PROCEDURE — 99024 POSTOP FOLLOW-UP VISIT: CPT

## 2018-07-21 LAB
CULTURE RESULTS: SIGNIFICANT CHANGE UP
CULTURE RESULTS: SIGNIFICANT CHANGE UP
SPECIMEN SOURCE: SIGNIFICANT CHANGE UP
SPECIMEN SOURCE: SIGNIFICANT CHANGE UP

## 2018-07-22 LAB
CULTURE RESULTS: SIGNIFICANT CHANGE UP
SPECIMEN SOURCE: SIGNIFICANT CHANGE UP

## 2018-07-27 ENCOUNTER — APPOINTMENT (OUTPATIENT)
Dept: TRAUMA SURGERY | Facility: CLINIC | Age: 68
End: 2018-07-27
Payer: MEDICARE

## 2018-07-27 VITALS
DIASTOLIC BLOOD PRESSURE: 89 MMHG | HEIGHT: 72 IN | BODY MASS INDEX: 36.7 KG/M2 | WEIGHT: 271 LBS | SYSTOLIC BLOOD PRESSURE: 151 MMHG | HEART RATE: 59 BPM | OXYGEN SATURATION: 97 % | TEMPERATURE: 97.6 F

## 2018-07-27 DIAGNOSIS — Z86.79 PERSONAL HISTORY OF OTHER DISEASES OF THE CIRCULATORY SYSTEM: ICD-10-CM

## 2018-07-27 DIAGNOSIS — Z82.49 FAMILY HISTORY OF ISCHEMIC HEART DISEASE AND OTHER DISEASES OF THE CIRCULATORY SYSTEM: ICD-10-CM

## 2018-07-27 DIAGNOSIS — L02.419 CUTANEOUS ABSCESS OF LIMB, UNSPECIFIED: ICD-10-CM

## 2018-07-27 DIAGNOSIS — Z78.9 OTHER SPECIFIED HEALTH STATUS: ICD-10-CM

## 2018-07-27 PROCEDURE — 99024 POSTOP FOLLOW-UP VISIT: CPT

## 2018-08-06 PROBLEM — L02.419 LEG ABSCESS: Status: ACTIVE | Noted: 2018-07-25

## 2018-11-16 NOTE — DISCHARGE NOTE ADULT - NURSING SECTION COMPLETE
Message    Date / Time: JOSE IBARRA RN, BSN 10/25 8135.      Reason for Call REQUESTING A REFILL ON HER ALPRAZOLAM 0.5MG 1 TID PRN, WOULD YOU LIKE TO AUTHORIZE THIS. THANKS ALFREDA      Plan   1. ALPRAZolam 0.5 MG Oral Tablet; 1 Three Times A Day, As Needed    Signatures   Electronically signed by : SAMANTA MACDONALD M.D.; Oct 25 2017  2:48PM CST (Author)    
Patient/Caregiver provided printed discharge information.

## 2019-12-27 ENCOUNTER — TRANSCRIPTION ENCOUNTER (OUTPATIENT)
Age: 69
End: 2019-12-27

## 2020-01-16 NOTE — DISCHARGE NOTE ADULT - FUNCTIONAL SCREEN CURRENT LEVEL: BATHING, MLM
Speech-Language Pathology  Daily Treatment Note    Date:  2020    Patient Name:  Susan Hensley    :  1974  MRN: [id number]    Restrictions/Precautions:    Treatment Diagnosis:  Pharyngeal Dysphagia; r13.13    Referring Physician: Marilu Quintanilla  care signed (Y/N):      Visit# / total visits:  2/    Pain level: 0/10     Progress Note: []  Yes  []  No  Next due by: Visit #10     Subjective:    Chart Reviewed: Yes  Onset Date: 19    SLP [facility] Information: [facility]  Total # of Visits Approved: 20    Subjective: Accepted and tolerated clinical evaluation    Behavior/Cognition: Alert; Cooperative;Pleasant mood  Communication Observation: Functional  Follows Directions: Complex  Dentition: Adequate  Baseline Vocal Quality: Normal; slightly hoarse  Volitional Cough: Strong; slightly wet    Consistencies Administered: Reg solid; Dysphagia Soft and Bite-Sized (Dysphagia III); Dysphagia Pureed (Dysphagia I); Thin - straw    Objective: The patient will tolerate recommended diet without observed clinical signs of aspiration;  · Patient reports taking liquids only at this time; PEG tube placed and patient taking nutrition and meds via tube  · Patient demonstrates safe toleration of purees and thin liquids  · Decreased appetite d/t nausea  · Some difficulty with denser solids d/t dry mouth  The patient/caregiver will demonstrate understanding of compensatory strategies for improved swallowing safety. · independent  The patient will maintain and/or improve swallow function via swallowing exercises completed 15/15 each  · Mod imp for current exercise program; decreased tolerance d/t decreased chemoradiation tolerance which is improving  · Patient to resume HEP    Assessment:   Reduced lingual ROM and dry mouth resulting in decreased oral tolerance of solids.   Mild pharyngeal stage dysphagia characterized by reduced laryngeal elevation and suspected reduced pharyngeal peristalsis with sensation of (0) independent

## 2021-01-01 ENCOUNTER — EMERGENCY (EMERGENCY)
Facility: HOSPITAL | Age: 71
LOS: 1 days | Discharge: HOME | End: 2021-01-01
Attending: EMERGENCY MEDICINE
Payer: COMMERCIAL

## 2021-01-01 VITALS
RESPIRATION RATE: 20 BRPM | HEIGHT: 72 IN | WEIGHT: 259.93 LBS | TEMPERATURE: 100 F | HEART RATE: 60 BPM | SYSTOLIC BLOOD PRESSURE: 176 MMHG | DIASTOLIC BLOOD PRESSURE: 100 MMHG | OXYGEN SATURATION: 96 %

## 2021-01-01 VITALS — SYSTOLIC BLOOD PRESSURE: 140 MMHG | DIASTOLIC BLOOD PRESSURE: 78 MMHG

## 2021-01-01 DIAGNOSIS — Z95.5 PRESENCE OF CORONARY ANGIOPLASTY IMPLANT AND GRAFT: Chronic | ICD-10-CM

## 2021-01-01 DIAGNOSIS — N36.8 OTHER SPECIFIED DISORDERS OF URETHRA: Chronic | ICD-10-CM

## 2021-01-01 LAB — SARS-COV-2 RNA SPEC QL NAA+PROBE: DETECTED

## 2021-01-01 PROCEDURE — U0003: CPT

## 2021-01-01 PROCEDURE — U0005: CPT

## 2021-01-01 PROCEDURE — 99284 EMERGENCY DEPT VISIT MOD MDM: CPT

## 2021-01-01 PROCEDURE — 99282 EMERGENCY DEPT VISIT SF MDM: CPT

## 2021-01-01 NOTE — ED STATDOCS - PATIENT PORTAL LINK FT
You can access the FollowMyHealth Patient Portal offered by [facility] by registering at the following website: [email or web address]. By joining [employer]’s FollowMyHealth portal, you will also be able to view your health information using other applications (apps) compatible with our system.

## 2021-01-01 NOTE — ED STATDOCS - CLINICAL SUMMARY MEDICAL DECISION MAKING FREE TEXT BOX
69yo male with flu-like symptoms- cough, low grade temp, +Sick contact. Well appearing and hemodynamically stable. Will obtain [facility], [place] home. Melanie Alba DO

## 2021-01-01 NOTE — ED ADULT TRIAGE NOTE - CHIEF COMPLAINT QUOTE
Patient arrived to ED today with c/o flu like symptoms for the past two days, diarrhea, nausea, body aches, and chills.

## 2021-01-01 NOTE — ED STATDOCS - OBJECTIVE STATEMENT
69yo male PMH Coronary Artery Disease s/p stents presenting with flu-like symptoms x 3 days- low grade fever, cough, body aches. Patient's fiancee home with similar symptoms. No shortness of breath, chest pain, palpitations, N/V/D. Requesting COVID swab.

## 2022-02-22 NOTE — ED ADULT NURSE NOTE - EXTENSIONS OF SELF_ADULT
None
Patient with constipation s/p percocet, well appearing. obtain labs, CT, and likely disimpaction.

## 2022-03-22 ENCOUNTER — TRANSCRIPTION ENCOUNTER (OUTPATIENT)
Age: 72
End: 2022-03-22

## 2023-02-12 ENCOUNTER — EMERGENCY (EMERGENCY)
Facility: HOSPITAL | Age: 73
LOS: 1 days | Discharge: HOME | End: 2023-02-12
Attending: STUDENT IN AN ORGANIZED HEALTH CARE EDUCATION/TRAINING PROGRAM
Payer: COMMERCIAL

## 2023-02-12 VITALS
DIASTOLIC BLOOD PRESSURE: 84 MMHG | TEMPERATURE: 99 F | WEIGHT: 274.26 LBS | OXYGEN SATURATION: 97 % | SYSTOLIC BLOOD PRESSURE: 138 MMHG | HEART RATE: 102 BPM | RESPIRATION RATE: 18 BRPM

## 2023-02-12 DIAGNOSIS — Z95.5 PRESENCE OF CORONARY ANGIOPLASTY IMPLANT AND GRAFT: Chronic | ICD-10-CM

## 2023-02-12 DIAGNOSIS — N36.8 OTHER SPECIFIED DISORDERS OF URETHRA: Chronic | ICD-10-CM

## 2023-02-12 LAB
ALBUMIN SERPL ELPH-MCNC: 4.1 G/DL — SIGNIFICANT CHANGE UP (ref 3.3–5.2)
ALP SERPL-CCNC: 50 U/L — SIGNIFICANT CHANGE UP (ref 40–120)
ALT FLD-CCNC: 12 U/L — SIGNIFICANT CHANGE UP
ANION GAP SERPL CALC-SCNC: 11 MMOL/L — SIGNIFICANT CHANGE UP (ref 5–17)
APTT BLD: 33.4 SEC — SIGNIFICANT CHANGE UP (ref 27.5–35.5)
AST SERPL-CCNC: 15 U/L — SIGNIFICANT CHANGE UP
BASOPHILS # BLD AUTO: 0.04 K/UL — SIGNIFICANT CHANGE UP (ref 0–0.2)
BASOPHILS NFR BLD AUTO: 0.5 % — SIGNIFICANT CHANGE UP (ref 0–2)
BILIRUB SERPL-MCNC: 0.5 MG/DL — SIGNIFICANT CHANGE UP (ref 0.4–2)
BUN SERPL-MCNC: 26.8 MG/DL — HIGH (ref 8–20)
CALCIUM SERPL-MCNC: 8.9 MG/DL — SIGNIFICANT CHANGE UP (ref 8.4–10.5)
CHLORIDE SERPL-SCNC: 101 MMOL/L — SIGNIFICANT CHANGE UP (ref 96–108)
CO2 SERPL-SCNC: 27 MMOL/L — SIGNIFICANT CHANGE UP (ref 22–29)
CREAT SERPL-MCNC: 1.09 MG/DL — SIGNIFICANT CHANGE UP (ref 0.5–1.3)
EGFR: 73 ML/MIN/1.73M2 — SIGNIFICANT CHANGE UP
EOSINOPHIL # BLD AUTO: 0.01 K/UL — SIGNIFICANT CHANGE UP (ref 0–0.5)
EOSINOPHIL NFR BLD AUTO: 0.1 % — SIGNIFICANT CHANGE UP (ref 0–6)
GLUCOSE SERPL-MCNC: 97 MG/DL — SIGNIFICANT CHANGE UP (ref 70–99)
HCT VFR BLD CALC: 46.9 % — SIGNIFICANT CHANGE UP (ref 39–50)
HGB BLD-MCNC: 15.9 G/DL — SIGNIFICANT CHANGE UP (ref 13–17)
IMM GRANULOCYTES NFR BLD AUTO: 0.4 % — SIGNIFICANT CHANGE UP (ref 0–0.9)
INR BLD: 1.01 RATIO — SIGNIFICANT CHANGE UP (ref 0.88–1.16)
LYMPHOCYTES # BLD AUTO: 2.12 K/UL — SIGNIFICANT CHANGE UP (ref 1–3.3)
LYMPHOCYTES # BLD AUTO: 25.3 % — SIGNIFICANT CHANGE UP (ref 13–44)
MCHC RBC-ENTMCNC: 30.6 PG — SIGNIFICANT CHANGE UP (ref 27–34)
MCHC RBC-ENTMCNC: 33.9 GM/DL — SIGNIFICANT CHANGE UP (ref 32–36)
MCV RBC AUTO: 90.4 FL — SIGNIFICANT CHANGE UP (ref 80–100)
MONOCYTES # BLD AUTO: 0.7 K/UL — SIGNIFICANT CHANGE UP (ref 0–0.9)
MONOCYTES NFR BLD AUTO: 8.4 % — SIGNIFICANT CHANGE UP (ref 2–14)
NEUTROPHILS # BLD AUTO: 5.48 K/UL — SIGNIFICANT CHANGE UP (ref 1.8–7.4)
NEUTROPHILS NFR BLD AUTO: 65.3 % — SIGNIFICANT CHANGE UP (ref 43–77)
PLATELET # BLD AUTO: 215 K/UL — SIGNIFICANT CHANGE UP (ref 150–400)
POTASSIUM SERPL-MCNC: 4 MMOL/L — SIGNIFICANT CHANGE UP (ref 3.5–5.3)
POTASSIUM SERPL-SCNC: 4 MMOL/L — SIGNIFICANT CHANGE UP (ref 3.5–5.3)
PROT SERPL-MCNC: 7 G/DL — SIGNIFICANT CHANGE UP (ref 6.6–8.7)
PROTHROM AB SERPL-ACNC: 11.7 SEC — SIGNIFICANT CHANGE UP (ref 10.5–13.4)
RBC # BLD: 5.19 M/UL — SIGNIFICANT CHANGE UP (ref 4.2–5.8)
RBC # FLD: 12.5 % — SIGNIFICANT CHANGE UP (ref 10.3–14.5)
SODIUM SERPL-SCNC: 139 MMOL/L — SIGNIFICANT CHANGE UP (ref 135–145)
TROPONIN T SERPL-MCNC: <0.01 NG/ML — SIGNIFICANT CHANGE UP (ref 0–0.06)
WBC # BLD: 8.38 K/UL — SIGNIFICANT CHANGE UP (ref 3.8–10.5)
WBC # FLD AUTO: 8.38 K/UL — SIGNIFICANT CHANGE UP (ref 3.8–10.5)

## 2023-02-12 PROCEDURE — 93010 ELECTROCARDIOGRAM REPORT: CPT

## 2023-02-12 PROCEDURE — 99285 EMERGENCY DEPT VISIT HI MDM: CPT

## 2023-02-12 PROCEDURE — 70496 CT ANGIOGRAPHY HEAD: CPT | Mod: 26,MA

## 2023-02-12 PROCEDURE — 0042T: CPT | Mod: MA

## 2023-02-12 PROCEDURE — 70498 CT ANGIOGRAPHY NECK: CPT | Mod: 26,MA

## 2023-02-12 RX ORDER — ONDANSETRON 8 MG/1
4 TABLET, FILM COATED ORAL ONCE
Refills: 0 | Status: COMPLETED | OUTPATIENT
Start: 2023-02-12 | End: 2023-02-12

## 2023-02-12 RX ORDER — SODIUM CHLORIDE 9 MG/ML
1000 INJECTION INTRAMUSCULAR; INTRAVENOUS; SUBCUTANEOUS ONCE
Refills: 0 | Status: COMPLETED | OUTPATIENT
Start: 2023-02-12 | End: 2023-02-12

## 2023-02-12 RX ORDER — MECLIZINE HCL 12.5 MG
25 TABLET ORAL ONCE
Refills: 0 | Status: COMPLETED | OUTPATIENT
Start: 2023-02-12 | End: 2023-02-12

## 2023-02-12 RX ADMIN — Medication 25 MILLIGRAM(S): at 22:50

## 2023-02-12 RX ADMIN — ONDANSETRON 4 MILLIGRAM(S): 8 TABLET, FILM COATED ORAL at 22:35

## 2023-02-12 RX ADMIN — SODIUM CHLORIDE 1000 MILLILITER(S): 9 INJECTION INTRAMUSCULAR; INTRAVENOUS; SUBCUTANEOUS at 22:50

## 2023-02-12 NOTE — ED PROVIDER NOTE - PHYSICAL EXAMINATION
General: NAD  Head: NC, AT  EENT: EOMI, no scleral icterus  Cardiac: RRR, no apparent murmurs, no lower extremity edema  Respiratory: CTABL, no respiratory distress   Abdomen: soft, ND, NT, nonperitonitic  MSK/Vascular: full ROM, soft compartments, warm extremities  Neuro: AAOx3, strength and sensation intact, cranial nerves intact, normal gait  Psych: calm, cooperative

## 2023-02-12 NOTE — ED PROVIDER NOTE - CLINICAL SUMMARY MEDICAL DECISION MAKING FREE TEXT BOX
71 y/o M with PMHx CAD s/p stents, HTN, HLD, who presents to the ED for hot flashes, blurry vision, nausea and vomiting, and chest pain. Code stroke, labs and CTs pending, tx with vertigo and nausea, reassess.

## 2023-02-12 NOTE — ED ADULT NURSE NOTE - NSIMPLEMENTINTERV_GEN_ALL_ED
Implemented All Fall Risk Interventions:  [place] to call system. Call bell, personal items and telephone within reach. Instruct patient to call for assistance. Room bathroom lighting operational. Non-slip footwear when patient is off stretcher. Physically safe environment: no spills, clutter or unnecessary equipment. Stretcher in lowest position, wheels locked, appropriate side rails in place. Provide visual cue, wrist band, yellow gown, etc. Monitor gait and stability. Monitor for mental status changes and reorient to person, place, and time. Review medications for side effects contributing to fall risk. Reinforce activity limits and safety measures with patient and family.

## 2023-02-12 NOTE — ED PROVIDER NOTE - ATTENDING CONTRIBUTION TO CARE
Lab Results   Component Value Date    EGFR 42 07/07/2022    EGFR 40 07/06/2022    EGFR 43 07/05/2022    CREATININE 1 48 (H) 07/07/2022    CREATININE 1 54 (H) 07/06/2022    CREATININE 1 44 (H) 07/05/2022     · Continue to monitor in setting of ongoing diuresis  · Baseline appears 1 6-1 7 I personally saw the patient with the resident, and completed the key components of the history and physical exam. I then discussed the management plan with the resident.

## 2023-02-12 NOTE — ED PROVIDER NOTE - OBJECTIVE STATEMENT
71 y/o M with PMHx CAD s/p stents, HTN, HLD, who presents to the ED for hot flashes, blurry vision, nausea and vomiting, and chest pain. Patient's wife at bedside. States that [place] were [place] to the airport and at approximately 6 PM they had to stop on the side of the road due to the onset of these symptoms. State that he was completely normal beforehand. States that the symptoms worsened since then which is why they decided to come in. State that this has never happened to him before and denies any history of CVAs, TIAs, or vertigo. States that he sees Dr. Mckinney for Cardiology and denies any current cardiac concerns. Denies any other complaints at this time.

## 2023-02-12 NOTE — ED PROVIDER NOTE - NS ED ROS FT
Constitutional: no fever, no chills  Head: NC, AT   Eyes: no redness   ENMT: no nasal congestion/drainage, no sore throat   CV: + chest pain, no edema  Resp: no cough, no dyspnea  GI: no abdominal pain, + nausea, + vomiting, no diarrhea  : no dysuria, no hematuria   Skin: no lesions, no rashes   Neuro: no LOC, no headache, no sensory deficits, no weakness

## 2023-02-12 NOTE — ED PROVIDER NOTE - NSICDXPASTSURGICALHX_GEN_ALL_CORE_FT
PAST SURGICAL HISTORY:  Acute urethral obstruction surgical procedure to relieve obstruction    S/P coronary artery stent placement

## 2023-02-12 NOTE — ED PROVIDER NOTE - PROGRESS NOTE DETAILS
CT scans with no acute pathologies. Spoke with stroke neurologist Dr. Pandey, recommends symptomatic treatment for vertigo, aspirin, and MRI. Further lab-work pending. - Neelima

## 2023-02-12 NOTE — ED ADULT NURSE NOTE - OBJECTIVE STATEMENT
c/o dizziness and vomiting. Pt stated he was driving to the airport when he started to develop hot flashes and dizziness, followed by multiple episodes of vomiting. Pt stated his dizziness comes and goes and lasts about 3 minutes each episode. LKW 1800. Pt AOx4, speaking coherently, able to move all extremities, no ataxia, no drifts or facial droops, sensory intact, respirations even and unlabored on RA, skin warm and dry. CODE STROKE activated in walk in triage, FS completed and pt brought to CT. Dr. Ortez at bedside upon arrival to CT. c/o dizziness and vomiting. Pt stated he was driving to the airport when he started to develop hot flashes and dizziness, followed by multiple episodes of vomiting. Pt stated his dizziness comes and goes and lasts about 3 minutes each episode. LKW 1800. PMH CAD, HTN, HLD, cardiomyopathy, 3 cardiac stents. Pt AOx4, speaking coherently, able to move all extremities, no ataxia, no drifts or facial droops, sensory intact, respirations even and unlabored on RA, skin warm and dry. CODE STROKE activated in walk in triage, FS completed and pt brought to CT. Dr. Ortez at bedside upon arrival to CT.

## 2023-02-12 NOTE — ED PROVIDER NOTE - NSICDXPASTMEDICALHX_GEN_ALL_CORE_FT
PAST MEDICAL HISTORY:  CAD (coronary artery disease)     Cardiomyopathy     Chest pain     ETOH abuse     HLD (hyperlipidemia)     HTN (hypertension)     Mild aortic insufficiency     Obesity     Urethral disorder urine retention likely secondary to urethral stricture

## 2023-02-13 VITALS
HEART RATE: 52 BPM | TEMPERATURE: 98 F | RESPIRATION RATE: 18 BRPM | SYSTOLIC BLOOD PRESSURE: 152 MMHG | OXYGEN SATURATION: 96 % | DIASTOLIC BLOOD PRESSURE: 84 MMHG

## 2023-02-13 DIAGNOSIS — I25.10 ATHEROSCLEROTIC HEART DISEASE OF NATIVE CORONARY ARTERY WITHOUT ANGINA PECTORIS: ICD-10-CM

## 2023-02-13 DIAGNOSIS — R42 DIZZINESS AND GIDDINESS: ICD-10-CM

## 2023-02-13 LAB
CHOLEST SERPL-MCNC: 198 MG/DL — SIGNIFICANT CHANGE UP
HDLC SERPL-MCNC: 60 MG/DL — SIGNIFICANT CHANGE UP
LIPID PNL WITH DIRECT LDL SERPL: 124 MG/DL — HIGH
NON HDL CHOLESTEROL: 138 MG/DL — HIGH
TRIGL SERPL-MCNC: 69 MG/DL — SIGNIFICANT CHANGE UP
TROPONIN T SERPL-MCNC: <0.01 NG/ML — SIGNIFICANT CHANGE UP (ref 0–0.06)

## 2023-02-13 PROCEDURE — 70450 CT HEAD/BRAIN W/O DYE: CPT | Mod: MA

## 2023-02-13 PROCEDURE — 80061 LIPID PANEL: CPT

## 2023-02-13 PROCEDURE — 70551 MRI BRAIN STEM W/O DYE: CPT | Mod: MA

## 2023-02-13 PROCEDURE — 70498 CT ANGIOGRAPHY NECK: CPT | Mod: MA

## 2023-02-13 PROCEDURE — 99285 EMERGENCY DEPT VISIT HI MDM: CPT | Mod: 25

## 2023-02-13 PROCEDURE — 99236 HOSP IP/OBS SAME DATE HI 85: CPT

## 2023-02-13 PROCEDURE — 0042T: CPT | Mod: MA

## 2023-02-13 PROCEDURE — C8929: CPT

## 2023-02-13 PROCEDURE — 85730 THROMBOPLASTIN TIME PARTIAL: CPT

## 2023-02-13 PROCEDURE — 99223 1ST HOSP IP/OBS HIGH 75: CPT

## 2023-02-13 PROCEDURE — 96374 THER/PROPH/DIAG INJ IV PUSH: CPT | Mod: XU

## 2023-02-13 PROCEDURE — 99222 1ST HOSP IP/OBS MODERATE 55: CPT

## 2023-02-13 PROCEDURE — 93880 EXTRACRANIAL BILAT STUDY: CPT | Mod: 26

## 2023-02-13 PROCEDURE — 85025 COMPLETE CBC W/AUTO DIFF WBC: CPT

## 2023-02-13 PROCEDURE — 36415 COLL VENOUS BLD VENIPUNCTURE: CPT

## 2023-02-13 PROCEDURE — 82962 GLUCOSE BLOOD TEST: CPT

## 2023-02-13 PROCEDURE — 70551 MRI BRAIN STEM W/O DYE: CPT | Mod: 26,MA

## 2023-02-13 PROCEDURE — 93880 EXTRACRANIAL BILAT STUDY: CPT

## 2023-02-13 PROCEDURE — 93306 TTE W/DOPPLER COMPLETE: CPT | Mod: 26

## 2023-02-13 PROCEDURE — 70496 CT ANGIOGRAPHY HEAD: CPT | Mod: MA

## 2023-02-13 PROCEDURE — 80053 COMPREHEN METABOLIC PANEL: CPT

## 2023-02-13 PROCEDURE — G0378: CPT

## 2023-02-13 PROCEDURE — 84484 ASSAY OF TROPONIN QUANT: CPT

## 2023-02-13 PROCEDURE — 93005 ELECTROCARDIOGRAM TRACING: CPT

## 2023-02-13 PROCEDURE — 85610 PROTHROMBIN TIME: CPT

## 2023-02-13 RX ORDER — MECLIZINE HCL 12.5 MG
25 TABLET ORAL EVERY 6 HOURS
Refills: 0 | Status: DISCONTINUED | OUTPATIENT
Start: 2023-02-13 | End: 2023-02-20

## 2023-02-13 RX ORDER — LISINOPRIL 2.5 MG/1
40 TABLET ORAL DAILY
Refills: 0 | Status: DISCONTINUED | OUTPATIENT
Start: 2023-02-13 | End: 2023-02-20

## 2023-02-13 RX ORDER — MECLIZINE HCL 12.5 MG
1 TABLET ORAL
Qty: 15 | Refills: 0
Start: 2023-02-13 | End: 2023-02-17

## 2023-02-13 RX ORDER — ASPIRIN/CALCIUM CARB/MAGNESIUM 324 MG
81 TABLET ORAL DAILY
Refills: 0 | Status: DISCONTINUED | OUTPATIENT
Start: 2023-02-13 | End: 2023-02-20

## 2023-02-13 RX ORDER — AMLODIPINE BESYLATE 2.5 MG/1
10 TABLET ORAL DAILY
Refills: 0 | Status: DISCONTINUED | OUTPATIENT
Start: 2023-02-13 | End: 2023-02-20

## 2023-02-13 RX ORDER — METOPROLOL TARTRATE 50 MG
12.5 TABLET ORAL DAILY
Refills: 0 | Status: DISCONTINUED | OUTPATIENT
Start: 2023-02-13 | End: 2023-02-20

## 2023-02-13 RX ADMIN — AMLODIPINE BESYLATE 10 MILLIGRAM(S): 2.5 TABLET ORAL at 05:09

## 2023-02-13 RX ADMIN — Medication 25 MILLIGRAM(S): at 11:21

## 2023-02-13 RX ADMIN — LISINOPRIL 40 MILLIGRAM(S): 2.5 TABLET ORAL at 05:09

## 2023-02-13 RX ADMIN — Medication 81 MILLIGRAM(S): at 11:21

## 2023-02-13 NOTE — ED CDU PROVIDER DISPOSITION NOTE - PATIENT PORTAL LINK FT
You can access the FollowMyHealth Patient Portal offered by [facility] by registering at the following website: [email or web address]. By joining [employer]’s FollowMyHealth portal, you will also be able to view your health information using other applications (apps) compatible with our system.

## 2023-02-13 NOTE — CONSULT NOTE ADULT - PROBLEM SELECTOR RECOMMENDATION 9
-telemetry overnight  -head CT and neck CTA unremarkable  -vital signs stable, EKG non-ischemic  -pt received IV hydration  -Zofran for nausea as needed  -f/u with primary cardiologist

## 2023-02-13 NOTE — CONSULT NOTE ADULT - NS ATTEND AMEND GEN_ALL_CORE FT
71 y/o male with PMHx of CAD s/p prior LAD and RPDA stent , presented with recurrent episodes of lighheadache, blurry vision   First episode happened while he was driving, he became hot, sweating and experienced the above symptoms   this subsided when he went home but then started again and he presented to ED when he vomited   He had an episode of presyncope as well while going to the bathroom   He is on amlodipine 10 , lisinopril 40 , metoprolol   He has drank alcohol before these episodes  Etiology of presyncope unclear but can represent vasovagal ( bathroom , sweating , vomiting )   obtain echo to evaluate for structural heart disease        if echo within normal , can be discharged to follow up with Dr. Mckinney. ek

## 2023-02-13 NOTE — ED CDU PROVIDER DISPOSITION NOTE - CARE PROVIDER_API CALL
Sol Pandey (MD)  EEGEpilepsy; Neurology; Neuromuscular Medicine; Sleep Medicine; Vascular Neurology  [address] [place] [id number]  Phone: [phone]  Fax: [phone]  Follow Up Time:

## 2023-02-13 NOTE — CONSULT NOTE ADULT - ASSESSMENT
ASSESSMENT: 73 y/o M with PMHx CAD s/p stents, HTN, HLD, who presented on the evening of 2/12/23 to the ED for hot flashes, blurry vision, nausea and vomiting, and chest pain that developed while they were driving to the airport and at approximately 6 PM. Stroke code called. NIHSS 0. CT head without acute infarction or hemorrhage, CT angiogram head/neck without evidence of large vessel occlusion or significant flow limiting stenosis.  Stroke neurology consulted to evaluate; rule out acute infarction. Suggest cardiac and vascular evaluation for acute process.     NEURO: Continue close monitoring for neurologic deterioration, permissive HTN with long term goal of normotension avoiding rapid fluctuations and hypotension, titrate statin to LDL goal less than 70, MRI Brain w/o, consider abdominal and chest angiography and imaging to screen for further vascular process. Physical therapy/OT/Speech eval/treatment.     ANTITHROMBOTIC THERAPY: ASA 81mg     PULMONARY: protecting airway, saturating well     CARDIOVASCULAR: check TTE, cardiac monitoring with telemetry, cardiology following along                          SBP goal: 140-160mmHg is average current range, if clinically tolerating can maintain this goal for now from neurological standpoint     GASTROINTESTINAL:  dysphagia screen passed, advance as tolerated , GI evaluation suggested      RENAL: BUN elevated/Cr within range, maintain adequate hydration, good urine output      Na Goal: Greater than 135      HEMATOLOGY: H/H within range, Platelets 215, PCP follow up for age and risk appropriate malignancy screenings      DVT ppx: no neurological contraindication to pharmacological dvt ppx, SCD if no medical contraindication       ID: afebrile, no leukocytosis, screen for underlying infection      DISPOSITION: Rehab or home depending on PT eval once stable and workup is complete      CORE MEASURES:        Admission NIHSS: 0     Tenecteplase : [] YES [x] NO      LDL/HDL/A1C: 124/60/x     Depression Screen: p     Statin Therapy: as noted      Dysphagia Screen: [x] PASS [] FAIL     Smoking [] YES [] NO      Afib [] YES [x] NO     Stroke Education [x] YES [] NO    Obtain screening lower extremity venous ultrasound in patients who meet 1 or more of the following criteria as patient is high risk for DVT/PE on admission:   [] History of DVT/PE  []Hypercoagulable states (Factor V Leiden, Cancer, OCP, etc. )  []Prolonged immobility (hemiplegia/hemiparesis/post operative or any other extended immobilization)  [] Transferred from outside facility (Rehab or Long term care)  [] Age </= to 50 ASSESSMENT: 73 y/o M with PMHx CAD s/p stents, HTN, HLD, who presented on the evening of 2/12/23 to the ED for hot flashes, blurry vision, nausea and vomiting, and chest pain that developed while they were driving to the airport and at approximately 6 PM. Stroke code called. NIHSS 0. CT head without acute infarction or hemorrhage, CT angiogram head/neck without evidence of large vessel occlusion or significant flow limiting stenosis.  Stroke neurology consulted to evaluate; rule out acute infarction. Suggest cardiac and vascular evaluation for acute process.     NEURO: Continue close monitoring for neurologic deterioration, permissive HTN with long term goal of normotension avoiding rapid fluctuations and hypotension, titrate statin to LDL goal less than 70, MRI Brain w/o, -- shows no acute infarct.  consider abdominal and chest angiography and imaging to screen for further vascular process. Physical therapy/OT/Speech eval/treatment.     ANTITHROMBOTIC THERAPY: ASA 81mg     PULMONARY: protecting airway, saturating well     CARDIOVASCULAR: check TTE, cardiac monitoring with telemetry, cardiology following along                          SBP goal: 140-160mmHg is average current range, if clinically tolerating can maintain this goal for now from neurological standpoint     GASTROINTESTINAL:  dysphagia screen passed, advance as tolerated , GI evaluation suggested      RENAL: BUN elevated/Cr within range, maintain adequate hydration, good urine output      Na Goal: Greater than 135      HEMATOLOGY: H/H within range, Platelets 215, PCP follow up for age and risk appropriate malignancy screenings      DVT ppx: no neurological contraindication to pharmacological dvt ppx, SCD if no medical contraindication       ID: afebrile, no leukocytosis, screen for underlying infection      DISPOSITION: Rehab or home depending on PT eval once stable and workup is complete      CORE MEASURES:        Admission NIHSS: 0     Tenecteplase : [] YES [x] NO      LDL/HDL/A1C: 124/60/x     Depression Screen: p     Statin Therapy: as noted      Dysphagia Screen: [x] PASS [] FAIL     Smoking [] YES [] NO      Afib [] YES [x] NO     Stroke Education [x] YES [] NO    Obtain screening lower extremity venous ultrasound in patients who meet 1 or more of the following criteria as patient is high risk for DVT/PE on admission:   [] History of DVT/PE  []Hypercoagulable states (Factor V Leiden, Cancer, OCP, etc. )  []Prolonged immobility (hemiplegia/hemiparesis/post operative or any other extended immobilization)  [] Transferred from outside facility (Rehab or Long term care)  [] Age </= to 50 ASSESSMENT: 73 y/o M with PMHx CAD s/p stents, HTN, HLD, who presented on the evening of 2/12/23 to the ED for hot flashes, blurry vision, nausea and vomiting, and chest pain that developed while they were driving to the airport and at approximately 6 PM. Stroke code called. NIHSS 0. CT head without acute infarction or hemorrhage, CT angiogram head/neck without evidence of large vessel occlusion or significant flow limiting stenosis.  Stroke neurology consulted to evaluate; rule out acute infarction. Suggest cardiac and vascular evaluation for acute process.     NEURO: Continue close monitoring for neurologic deterioration, permissive HTN with long term goal of normotension avoiding rapid fluctuations and hypotension, titrate statin to LDL goal less than 70, MRI Brain w/o, -- shows no acute infarct.  consider abdominal and chest angiography and imaging to screen for further vascular process.  Physical therapy/OT/Speech eval/treatment.   - Recommend a Trial of Meclizine for vertigo.  - and compazine/reglan/zofran for nausea vomiting.    ANTITHROMBOTIC THERAPY: ASA 81mg     PULMONARY: protecting airway, saturating well     CARDIOVASCULAR: check TTE, cardiac monitoring with telemetry, cardiology following along                          SBP goal: 140-160mmHg is average current range, if clinically tolerating can maintain this goal for now from neurological standpoint     GASTROINTESTINAL:  dysphagia screen passed, advance as tolerated , GI evaluation suggested      RENAL: BUN elevated/Cr within range, maintain adequate hydration, good urine output      Na Goal: Greater than 135      HEMATOLOGY: H/H within range, Platelets 215, PCP follow up for age and risk appropriate malignancy screenings      DVT ppx: no neurological contraindication to pharmacological dvt ppx, SCD if no medical contraindication       ID: afebrile, no leukocytosis, screen for underlying infection      DISPOSITION: Rehab or home depending on PT eval once stable and workup is complete      CORE MEASURES:        Admission NIHSS: 0     Tenecteplase : [] YES [x] NO      LDL/HDL/A1C: 124/60/x     Depression Screen: p     Statin Therapy: as noted      Dysphagia Screen: [x] PASS [] FAIL     Smoking [] YES [] NO      Afib [] YES [x] NO     Stroke Education [x] YES [] NO    Obtain screening lower extremity venous ultrasound in patients who meet 1 or more of the following criteria as patient is high risk for DVT/PE on admission:   [] History of DVT/PE  []Hypercoagulable states (Factor V Leiden, Cancer, OCP, etc. )  []Prolonged immobility (hemiplegia/hemiparesis/post operative or any other extended immobilization)  [] Transferred from outside facility (Rehab or Long term care)  [] Age </= to 50

## 2023-02-13 NOTE — ED CDU PROVIDER INITIAL DAY NOTE - ATTENDING APP SHARED VISIT CONTRIBUTION OF CARE
I personally saw the patient with the MICHAEL, and completed the key components of the history and physical exam. I then discussed the management plan with the MICHAEL.    in obs for MRI and cards consult

## 2023-02-13 NOTE — ED CDU PROVIDER INITIAL DAY NOTE - PROGRESS NOTE DETAILS
Pt presented to the ED for transient incident of light headiness/ dizziness. Pt placed in obs for imaging and eval by specialist. Pt with no acute findings, neuro vasc intact, sig clinical improvement with conservative management, Pt to be dc home with meds to pharmacy follow up to cards, neuro, educated about when to return to the ED if needed. PT verbalizes that he understands all instructions and results. Pt informed that ED is open and available 24/7 365 days a yr, encouraged to return to the ED if they have any change in condition, or feel the need for revaluation.

## 2023-02-13 NOTE — CONSULT NOTE ADULT - ASSESSMENT
This is 73 y/o male with hx of CAD s/p stent (2017), HTN, HLD who presents with lightheadedness and blurry vision. Pt states that he was driving with his wife to the airport when he suddenly felt hot and sweating, disoriented, with blurry vision. Pt vomited upon arrival to the ED. He denies chest pain, SOB or palpitations. Pt admits to drinking a glass of old wine that afternoon before driving. EKG shows SR with septal Q-waves, no acute ST/T changes. Troponin negative x1. Pt used to follow with [facility] but recently switched to Dr. Mckinney in [place] office. He had a nuclear stress test last November and was told everything was OK.

## 2023-02-13 NOTE — ED CDU PROVIDER DISPOSITION NOTE - ADDITIONAL NOTES AND INSTRUCTIONS:
PT was evaluated At [facility] ED and was found to have a condition that warranted time of to rest and heal from WORK/SCHOOL.   Angelito Sylvester PA-C

## 2023-02-13 NOTE — CONSULT NOTE ADULT - SUBJECTIVE AND OBJECTIVE BOX
Preliminary note, official recommendations and note pending attending review/signature.     CC: hot flashes, blurry vision, nausea and vomiting, and chest pain. Code stroke called  HPI:  71 y/o M with PMHx CAD s/p stents, HTN, HLD, who presented to the ED for hot flashes, blurry vision, nausea and vomiting, and chest pain. Patient's wife was at bedside. Stated that they were driving to the airport and at approximately 6 PM they had to stop on the side of the road due to the onset of these symptoms. State that he was completely normal beforehand. Stated that the symptoms worsened since then which is why they decided to come in. Stated that this has never happened to him before and denied any history of CVAs, TIAs, or vertigo. Sees Dr. Mckinney for Cardiology and denies any current cardiac concerns.     PAST MEDICAL & SURGICAL HISTORY:  Mild aortic insufficiency  Cardiomyopathy  HLD (hyperlipidemia)  HTN (hypertension)  Obesity  Chest pain  ETOH abuse  Urethral disorder  urine retention likely secondary to urethral stricture  CAD (coronary artery disease)  Acute urethral obstruction  surgical procedure to relieve obstruction  S/P coronary artery stent placement    MEDICATIONS  (STANDING):  amLODIPine   Tablet 10 milliGRAM(s) Oral daily  aspirin enteric coated 81 milliGRAM(s) Oral daily  lisinopril 40 milliGRAM(s) Oral daily  meclizine 25 milliGRAM(s) Oral every 6 hours  metoprolol succinate ER 12.5 milliGRAM(s) Oral daily    MEDICATIONS  (PRN): -    Allergies  No Known Allergies    SOCIAL HISTORY:  no tob,   no alcohol   no drugs    FAMILY HISTORY:  Family history of CVA (Father, Mother)    ROS: 14 point ROS negative other than what is present in HPI or below    Vital Signs Last 24 Hrs  T(C): 36.5 (13 Feb 2023 07:53), Max: 37 (12 Feb 2023 22:18)  T(F): 97.7 (13 Feb 2023 07:53), Max: 98.6 (12 Feb 2023 22:18)  HR: 61 (13 Feb 2023 07:53) (55 - 102)  BP: 166/89 (13 Feb 2023 07:53) (138/84 - 170/88)  BP(mean): 118 (12 Feb 2023 22:48) (118 - 118)  RR: 19 (13 Feb 2023 07:53) (18 - 20)  SpO2: 97% (13 Feb 2023 07:53) (96% - 98%)    Parameters below as of 13 Feb 2023 07:53  Patient On (Oxygen Delivery Method): room air          General: NAD    Detailed Neurologic Exam:    Mental status: The patient is awake and alert and has normal attention span.  The patient is fully oriented in 3 spheres. The patient is oriented to current events. The patient is able to name objects, follow commands, repeat sentences.    Cranial nerves: Pupils equal and react symmetrically to light. There is no visual field deficit to confrontation. Extraocular motion is full with no nystagmus. There is no ptosis. Facial sensation is intact. Facial musculature is symmetric. Palate elevates symmetrically. Tongue is midline.    Motor: There is normal bulk and tone.  There is no tremor.  Strength is 5/5 in the right arm and leg.   Strength is 5/5 in the left arm and leg.    Sensation: Intact to light touch and pin in 4 extremities    Reflexes: 1-2+ throughout and plantar responses are flexor.    Cerebellar: There is no dysmetria on finger to nose testing.    Gait : deferred    NIH SS:  DATE: 2/13/23  TIME:  1A: Level of consciousness (0-3):   1B: Questions (0-2):   1C: Commands (0-2):   2: Gaze (0-2):   3: Visual fields (0-3):   4: Facial palsy (0-3):   MOTOR:  5A: Left arm motor drift (0-4):   5B: Right arm motor drift (0-4):   6A: Left leg motor drift (0-4):   6B: Right leg motor drift (0-4):   7: Limb ataxia (0-2):   SENSORY:  8: Sensation (0-2):   SPEECH:  9: Language (0-3):   10: Dysarthria (0-2):   EXTINCTION:  11: Extinction/inattention (0-2):     TOTAL SCORE:     prehospital mRS=      LABS:                         15.9   8.38  )-----------( 215      ( 12 Feb 2023 22:25 )             46.9       02-12    139  |  101  |  26.8<H>  ----------------------------<  97  4.0   |  27.0  |  1.09    Ca    8.9      12 Feb 2023 22:25    TPro  7.0  /  Alb  4.1  /  TBili  0.5  /  DBili  x   /  AST  15  /  ALT  12  /  AlkPhos  50  02-12      PT/INR - ( 12 Feb 2023 22:25 )   PT: 11.7 sec;   INR: 1.01 ratio         PTT - ( 12 Feb 2023 22:25 )  PTT:33.4 sec    Lipid panel: 124/60    HgbA1c: pending       RADIOLOGY & ADDITIONAL STUDIES (independently reviewed unless otherwise noted):  02/12/23  CT BRAIN STROKE PROTOCOL No intracranial bleed, mass effect or acute territorial   infarct.  CT PERFUSION: There are no perfusion abnormalities.  CTA BRAIN: Patent anterior and posterior circulations without   flow-limiting stenosis or vascular occlusion.  CTA NECK: Patent anterior and posterior circulations without significant   ICA stenosis as per NASCET criteria.   Preliminary note, official recommendations and note pending attending review/signature.     CC: hot flashes, blurry vision, nausea and vomiting, and chest pain. Code stroke called  HPI:  73 y/o M with PMHx CAD s/p stents, HTN, HLD, who presented to the ED for hot flashes, blurry vision, nausea and vomiting, and chest pain. Patient's wife was at bedside. Stated that they were driving to the airport and at approximately 6 PM they had to stop on the side of the road due to the onset of these symptoms. State that he was completely normal beforehand. Stated that the symptoms worsened since then which is why they decided to come in. Stated that this has never happened to him before and denied any history of CVAs, TIAs, or vertigo. Sees Dr. Mckinney for Cardiology and denies any current cardiac concerns.     PAST MEDICAL & SURGICAL HISTORY:  Mild aortic insufficiency  Cardiomyopathy  HLD (hyperlipidemia)  HTN (hypertension)  Obesity  Chest pain  ETOH abuse  Urethral disorder  urine retention likely secondary to urethral stricture  CAD (coronary artery disease)  Acute urethral obstruction  surgical procedure to relieve obstruction  S/P coronary artery stent placement    MEDICATIONS  (STANDING):  amLODIPine   Tablet 10 milliGRAM(s) Oral daily  aspirin enteric coated 81 milliGRAM(s) Oral daily  lisinopril 40 milliGRAM(s) Oral daily  meclizine 25 milliGRAM(s) Oral every 6 hours  metoprolol succinate ER 12.5 milliGRAM(s) Oral daily    MEDICATIONS  (PRN): -    Allergies  No Known Allergies    SOCIAL HISTORY:  no tob,   no alcohol   no drugs    FAMILY HISTORY:  Family history of CVA (Father, Mother)    ROS: 14 point ROS negative other than what is present in HPI or below    Vital Signs Last 24 Hrs  T(C): 36.5 (13 Feb 2023 07:53), Max: 37 (12 Feb 2023 22:18)  T(F): 97.7 (13 Feb 2023 07:53), Max: 98.6 (12 Feb 2023 22:18)  HR: 61 (13 Feb 2023 07:53) (55 - 102)  BP: 166/89 (13 Feb 2023 07:53) (138/84 - 170/88)  BP(mean): 118 (12 Feb 2023 22:48) (118 - 118)  RR: 19 (13 Feb 2023 07:53) (18 - 20)  SpO2: 97% (13 Feb 2023 07:53) (96% - 98%)    Parameters below as of 13 Feb 2023 07:53  Patient On (Oxygen Delivery Method): room air          General: NAD    Detailed Neurologic Exam:    Mental status: The patient is awake and alert and has normal attention span.  The patient is fully oriented in 3 spheres. The patient is oriented to current events. The patient is able to name objects, follow commands, repeat sentences.    Cranial nerves: Pupils equal and react symmetrically to light. There is no visual field deficit to confrontation. Extraocular motion is full with no nystagmus. There is no ptosis. Facial sensation is intact. Facial musculature is symmetric. Palate elevates symmetrically. Tongue is midline.    Motor: There is normal bulk and tone.  There is no tremor.  Strength is 5/5 in the right arm and leg.   Strength is 5/5 in the left arm and leg.    Sensation: Intact to light touch and pin in 4 extremities    Reflexes: 1-2+ throughout and plantar responses are flexor.    Cerebellar: There is no dysmetria on finger to nose testing.    Gait : deferred    NIH SS:  DATE: 2/13/23  TIME:  1A: Level of consciousness (0-3):   1B: Questions (0-2):   1C: Commands (0-2):   2: Gaze (0-2):   3: Visual fields (0-3):   4: Facial palsy (0-3):   MOTOR:  5A: Left arm motor drift (0-4):   5B: Right arm motor drift (0-4):   6A: Left leg motor drift (0-4):   6B: Right leg motor drift (0-4):   7: Limb ataxia (0-2):   SENSORY:  8: Sensation (0-2):   SPEECH:  9: Language (0-3):   10: Dysarthria (0-2):   EXTINCTION:  11: Extinction/inattention (0-2):     TOTAL SCORE:     prehospital mRS=      LABS:                         15.9   8.38  )-----------( 215      ( 12 Feb 2023 22:25 )             46.9       02-12    139  |  101  |  26.8<H>  ----------------------------<  97  4.0   |  27.0  |  1.09    Ca    8.9      12 Feb 2023 22:25    TPro  7.0  /  Alb  4.1  /  TBili  0.5  /  DBili  x   /  AST  15  /  ALT  12  /  AlkPhos  50  02-12      PT/INR - ( 12 Feb 2023 22:25 )   PT: 11.7 sec;   INR: 1.01 ratio         PTT - ( 12 Feb 2023 22:25 )  PTT:33.4 sec    Lipid panel: 124/60    HgbA1c: pending       RADIOLOGY & ADDITIONAL STUDIES (independently reviewed unless otherwise noted):  02/12/23  CT BRAIN STROKE PROTOCOL No intracranial bleed, mass effect or acute territorial   infarct.  CT PERFUSION: There are no perfusion abnormalities.  CTA BRAIN: Patent anterior and posterior circulations without   flow-limiting stenosis or vascular occlusion.  CTA NECK: Patent anterior and posterior circulations without significant   ICA stenosis as per NASCET criteria.  < from: MR Head No Cont (02.13.23 @ 07:20) >    IMPRESSION: No acute or subacute stroke.    --- End of Report ---            LEONIDES WATTS MD; Attending Radiologist    < end of copied text >           CC: hot flashes, blurry vision, nausea and vomiting, and chest pain. Code stroke called  HPI:  73 y/o M with PMHx CAD s/p stents, HTN, HLD, who presented to the ED for hot flashes, blurry vision, nausea and vomiting, and chest pain. Patient's wife was at bedside. Stated that they were driving to the airport and at approximately 6 PM they had to stop on the side of the road due to the onset of these symptoms. State that he was completely normal beforehand. Stated that the symptoms worsened since then which is why they decided to come in. Stated that this has never happened to him before and denied any history of CVAs, TIAs, or vertigo. Sees Dr. Mckinney for Cardiology and denies any current cardiac concerns.     PAST MEDICAL & SURGICAL HISTORY:  Mild aortic insufficiency  Cardiomyopathy  HLD (hyperlipidemia)  HTN (hypertension)  Obesity  Chest pain  ETOH abuse  Urethral disorder  urine retention likely secondary to urethral stricture  CAD (coronary artery disease)  Acute urethral obstruction  surgical procedure to relieve obstruction  S/P coronary artery stent placement    MEDICATIONS  (STANDING):  amLODIPine   Tablet 10 milliGRAM(s) Oral daily  aspirin enteric coated 81 milliGRAM(s) Oral daily  lisinopril 40 milliGRAM(s) Oral daily  meclizine 25 milliGRAM(s) Oral every 6 hours  metoprolol succinate ER 12.5 milliGRAM(s) Oral daily    MEDICATIONS  (PRN): -    Allergies  No Known Allergies    SOCIAL HISTORY:  no tob,   no alcohol   no drugs    FAMILY HISTORY:  Family history of CVA (Father, Mother)    ROS: 14 point ROS negative other than what is present in HPI or below    Vital Signs Last 24 Hrs  T(C): 36.5 (13 Feb 2023 07:53), Max: 37 (12 Feb 2023 22:18)  T(F): 97.7 (13 Feb 2023 07:53), Max: 98.6 (12 Feb 2023 22:18)  HR: 61 (13 Feb 2023 07:53) (55 - 102)  BP: 166/89 (13 Feb 2023 07:53) (138/84 - 170/88)  BP(mean): 118 (12 Feb 2023 22:48) (118 - 118)  RR: 19 (13 Feb 2023 07:53) (18 - 20)  SpO2: 97% (13 Feb 2023 07:53) (96% - 98%)    Parameters below as of 13 Feb 2023 07:53  Patient On (Oxygen Delivery Method): room air          General: NAD    Detailed Neurologic Exam:    Mental status: The patient is awake and alert and has normal attention span.  The patient is fully oriented in 3 spheres. The patient is oriented to current events. The patient is able to name objects, follow commands, repeat sentences.    Cranial nerves: Pupils equal and react symmetrically to light. There is no visual field deficit to confrontation. Extraocular motion is full with no nystagmus. There is no ptosis. Facial sensation is intact. Facial musculature is symmetric. Palate elevates symmetrically. Tongue is midline.    Motor: There is normal bulk and tone.  There is no tremor.  Strength is 5/5 in the right arm and leg.   Strength is 5/5 in the left arm and leg.    Sensation: Intact to light touch and pin in 4 extremities    Reflexes: 1-2+ throughout and plantar responses are flexor.    Cerebellar: There is no dysmetria on finger to nose testing.    Gait : deferred    [facility] SS:  DATE: 2/13/23  TIME:  1A: Level of consciousness (0-3):   1B: Questions (0-2):   1C: Commands (0-2):   2: Gaze (0-2):   3: Visual fields (0-3):   4: Facial palsy (0-3):   MOTOR:  5A: Left arm motor drift (0-4):   5B: Right arm motor drift (0-4):   6A: Left leg motor drift (0-4):   6B: Right leg motor drift (0-4):   7: Limb ataxia (0-2):   SENSORY:  8: Sensation (0-2):   SPEECH:  9: Language (0-3):   10: Dysarthria (0-2):   EXTINCTION:  11: Extinction/inattention (0-2):     TOTAL SCORE:     prehospital mRS=      LABS:                         15.9   8.38  )-----------( 215      ( 12 Feb 2023 22:25 )             46.9       02-12    139  |  101  |  26.8<H>  ----------------------------<  97  4.0   |  27.0  |  1.09    Ca    8.9      12 Feb 2023 22:25    TPro  7.0  /  Alb  4.1  /  TBili  0.5  /  DBili  x   /  AST  15  /  ALT  12  /  AlkPhos  50  02-12      PT/INR - ( 12 Feb 2023 22:25 )   PT: 11.7 sec;   INR: 1.01 ratio         PTT - ( 12 Feb 2023 22:25 )  PTT:33.4 sec    Lipid panel: 124/60    HgbA1c: pending       RADIOLOGY & ADDITIONAL STUDIES (independently reviewed unless otherwise noted):  02/12/23  CT BRAIN STROKE PROTOCOL No intracranial bleed, mass effect or acute territorial   infarct.  CT PERFUSION: There are no perfusion abnormalities.  CTA BRAIN: Patent anterior and posterior circulations without   flow-limiting stenosis or vascular occlusion.  CTA NECK: Patent anterior and posterior circulations without significant   ICA stenosis as per NASCET criteria.  MR Head No Cont (02.13.23 @ 07:20) >    IMPRESSION: No acute or subacute stroke.    LEONIDES WATTS MD; Attending Radiologist

## 2023-02-13 NOTE — CONSULT NOTE ADULT - SUBJECTIVE AND OBJECTIVE BOX
[facility]                                              CARDIOLOGY AT [facility]                                                   [address] [facility] [place] [place] [address]                                             Telephone: [phone]. Fax:[phone]                                                       CARDIOLOGY CONSULTATION NOTE                                                                                             History obtained by: Patient and medical record  Community Cardiologist: Dr. Mckinney   obtained: Yes [  ] No [ x ]  Reason for Consultation: lightheadedness  Available out pt records reviewed: Yes [x  ] No [  ]    Chief complaint:  "My vision was blurry"    HPI:  This is 73 y/o male with hx of CAD s/p stent (2017), HTN, HLD who presents with lightheadedness and blurry vision. Pt states that he was driving with his wife to the airport when he suddenly felt hot and sweating, disoriented, with blurry vision. Pt vomited upon arrival to the ED. He denies chest pain, SOB or palpitations. Pt admits to drinking a glass of old wine that afternoon before driving. EKG shows SR with septal Q-waves, no acute ST/T changes. Troponin negative x1. Pt used to follow with [facility] but recently switched to Dr. Mckinney in [place] office. He had a nuclear stress test last November and was told everything was OK.    CARDIAC TESTING   ECHO: n/a    STRESS: n/a    CATH:   < from: Cardiac Cath Lab - Adult (09.19.17 @ 08:39) >  VENTRICLES: EF was not assessed.  CORONARY VESSELS: The coronary circulation is right dominant.  LM:   --  LM: Normal.  LAD:   --  Proximal LAD: There was a tubular 0 % stenosis at the site of a  prior stent.  --  Mid LAD: Therewas a diffuse 0 % stenosis at the site of a prior stent.  CX:   --  Circumflex: This vessel was not injected.  RCA:   --  Proximal RCA: There was a tubular 20 % stenosis.  --  RPDA: There was a tubular 90 % stenosis.  COMPLICATIONS: No complications occurred during the cath lab visit.  SUMMARY:  1ST LESION INTERVENTIONS: A successful drug-eluting stent was performed on  the 90 % lesion in the right posterior descending artery. Following  intervention there was an excellent angiographic appearancewith a 0 %  residual stenosis.  DIAGNOSTIC RECOMMENDATIONS: Continue Aspirin and Effient.  INTERVENTIONAL RECOMMENDATIONS: Continue Aspirin and Effient.  Prepared and signed by  Chinmay Butler MD  Signed 09/19/2017 12:27:03    < end of copied text >      ELECTROPHYSIOLOGY: n/a    PAST MEDICAL HISTORY  Mild aortic insufficiency    Cardiomyopathy    HLD (hyperlipidemia)    HTN (hypertension)    Obesity    Chest pain    ETOH abuse    Urethral disorder    CAD (coronary artery disease)        PAST SURGICAL HISTORY  H/O lithotripsy    No significant past surgical history    Acute urethral obstruction    S/P coronary artery stent placement        SOCIAL HISTORY:  lives with wife  CIGARETTES:   former smoker, quit 40 years ago  ALCOHOL:  2 glasses of wine every other day  DRUGS: denies    FAMILY HISTORY:  Family history of CVA (Father, Mother)      Family History of Cardiovascular Disease:  Yes [  ] No [  ]  Coronary Artery Disease in first degree relative: Yes [  ] No [  ]  Sudden Cardiac Death in First degree relative: Yes [  ] No [  ]    HOME MEDICATIONS:  aspirin 81 mg oral tablet: 1 tab(s) orally once a day (17 Jul 2018 12:02)  lisinopril 40 mg oral tablet: 1 tab(s) orally once a day (17 Jul 2018 12:02)  Crestor 10 mg  Metoprolol ER 12.5 mg daily      CURRENT CARDIAC MEDICATIONS:  amLODIPine   Tablet 10 milliGRAM(s) Oral daily  lisinopril 40 milliGRAM(s) Oral daily      CURRENT OTHER MEDICATIONS:  aspirin enteric coated 81 milliGRAM(s) Oral daily      ALLERGIES:   No Known Allergies      REVIEW OF SYMPTOMS:   CONSTITUTIONAL: No fever, no chills, no weight loss, no weight gain, no fatigue   ENMT:  No vertigo; No sinus or throat pain  NECK: No pain or stiffness  CARDIOVASCULAR: as per HPI  RESPIRATORY: no Shortness of breath, no cough, no wheezing  : No dysuria, no hematuria   GI: No dark color stool, no nausea, no diarrhea, no constipation, no abdominal pain   NEURO: No headache, no slurred speech   MUSCULOSKELETAL: No joint pain or swelling; No muscle, back, or extremity pain  PSYCH: No agitation, no anxiety.    ALL OTHER REVIEW OF SYSTEMS ARE NEGATIVE.    VITAL SIGNS:  T(C): 37 (02-12-23 @ 22:18), Max: 37 (02-12-23 @ 22:18)  T(F): 98.6 (02-12-23 @ 22:18), Max: 98.6 (02-12-23 @ 22:18)  HR: 78 (02-12-23 @ 23:25) (78 - 102)  BP: 170/88 (02-12-23 @ 23:25) (138/84 - 170/88)  RR: 20 (02-12-23 @ 23:25) (18 - 20)  SpO2: 98% (02-12-23 @ 23:25) (97% - 98%)    INTAKE AND OUTPUT:       PHYSICAL EXAM:  Constitutional: Comfortable . No acute distress.   HEENT: Atraumatic and normocephalic , neck is supple . no JVD. No carotid bruit.  CNS: A&Ox3. No focal deficits.   Respiratory: CTAB, unlabored   Cardiovascular: RRR normal s1 s2. No murmurs  Gastrointestinal: Soft, non-tender. +Bowel sounds.   Extremities: 2+ Peripheral Pulses, No clubbing, cyanosis, or edema  Psychiatric: Calm . no agitation.   Skin: Warm and dry, no ulcers on extremities     LABS:  ( 13 Feb 2023 01:38 )  Troponin T  <0.01,  CPK  X    , CKMB  X    , BNP X        , ( 12 Feb 2023 22:25 )  Troponin T  <0.01,  CPK  X    , CKMB  X    , BNP X                                  15.9   8.38  )-----------( 215      ( 12 Feb 2023 22:25 )             46.9     02-12    139  |  101  |  26.8<H>  ----------------------------<  97  4.0   |  27.0  |  1.09    Ca    8.9      12 Feb 2023 22:25    TPro  7.0  /  Alb  4.1  /  TBili  0.5  /  DBili  x   /  AST  15  /  ALT  12  /  AlkPhos  50  02-12    PT/INR - ( 12 Feb 2023 22:25 )   PT: 11.7 sec;   INR: 1.01 ratio         PTT - ( 12 Feb 2023 22:25 )  PTT:33.4 sec        INTERPRETATION OF TELEMETRY: SR 70's    ECG: SR 77 bpm, septal Q-waves  Prior ECG: Yes [x  ] No [  ]    RADIOLOGY & ADDITIONAL STUDIES:    X-ray:    CT scan:   < from: CT Angio Neck Stroke Protocol w/ IV Cont (02.12.23 @ 22:52) >    IMPRESSION:    CT PERFUSION: There are no perfusion abnormalities.    CTA BRAIN: Patent anterior and posterior circulations without   flow-limiting stenosis or vascular occlusion.    CTA NECK: Patent anterior and posterior circulations without significant   ICA stenosis asper NASCET criteria.    --- End of Report ---      LEONIDES WATTS MD; Attending Radiologist  This document has been electronically signed. Feb 12 2023 11:17PM    < end of copied text >    MRI:   US:

## 2023-02-13 NOTE — ED ADULT NURSE REASSESSMENT NOTE - NS ED NURSE REASSESS COMMENT FT1
Pt resting comfortably, stated he feels completely back to normal. Pt denies HA, dizziness, SOB, N/V/D, CP, pain, palpitations at this time. CM and  in place, bed locked in the lowest position, side rails up.
Report received from Diamond RN at [facility], care assumed.  Patient transferred from critical care to [facility] s/p code stroke alert called.  Patient currently A&Ox4 w/ no neuro deficits noted.  Placed on cardiac monitor, NSR  Patient in no acute distress at this time.  Patient placed on observation status for MRI in AM.  Wife at bedside.  Offers no complaints at this time.
Pt placed in OBS, report given to Zamzam Lujan RN and pt transported to [facility]. Pt denies HA, dizziness, SOB, N/V/D, CP, palpitations at this time. CM and  in place, bed locked in the lowest position, side rails up
A&Ox4. Complains of dizziness, "the room is spinning".  Neuro consult at bedside. MRI results pending.

## 2023-02-13 NOTE — ED CDU PROVIDER INITIAL DAY NOTE - CLINICAL SUMMARY MEDICAL DECISION MAKING FREE TEXT BOX
73 y/o M with PMHx CAD s/p stents, HTN, HLD, who presents to the ED for hot flashes, blurry vision, nausea and vomiting, and chest pain. Code stroke called. NIHHS 0. Pt denies current symptoms.   CT scans with no acute pathologies.  -MRI pending  - Cardiology consult  -  trend trop.

## 2023-02-13 NOTE — ED CDU PROVIDER DISPOSITION NOTE - NSFOLLOWUPCLINICS_GEN_ALL_ED_FT
[facility]  Cardiology  [address] [facility], [address]  [place] [address]  Phone: [phone]  Fax:

## 2023-10-10 NOTE — ED CDU PROVIDER INITIAL DAY NOTE - WET READ LAUNCH FT
There are no Wet Read(s) to document. Vtama Pregnancy And Lactation Text: It is unknown if this medication can cause problems during pregnancy and breastfeeding.

## 2024-05-04 ENCOUNTER — NON-APPOINTMENT (OUTPATIENT)
Age: 74
End: 2024-05-04

## 2024-07-26 ENCOUNTER — APPOINTMENT (OUTPATIENT)
Dept: SURGERY | Facility: CLINIC | Age: 74
End: 2024-07-26
Payer: MEDICARE

## 2024-07-26 VITALS
HEART RATE: 51 BPM | TEMPERATURE: 97.8 F | HEIGHT: 70.5 IN | RESPIRATION RATE: 16 BRPM | BODY MASS INDEX: 37.09 KG/M2 | SYSTOLIC BLOOD PRESSURE: 164 MMHG | WEIGHT: 262 LBS | DIASTOLIC BLOOD PRESSURE: 82 MMHG | OXYGEN SATURATION: 96 %

## 2024-07-26 VITALS — SYSTOLIC BLOOD PRESSURE: 162 MMHG | DIASTOLIC BLOOD PRESSURE: 84 MMHG

## 2024-07-26 DIAGNOSIS — K42.9 UMBILICAL HERNIA W/OUT OBSTRUCTION OR GANGRENE: ICD-10-CM

## 2024-07-26 DIAGNOSIS — K43.9 VENTRAL HERNIA W/OUT OBSTRUCTION OR GANGRENE: ICD-10-CM

## 2024-07-26 PROCEDURE — 99204 OFFICE O/P NEW MOD 45 MIN: CPT

## 2024-07-26 NOTE — ASSESSMENT
[FreeTextEntry1] : Mr. HENDRICKS is a 73 year old man with umbilical hernia and supraumbilical hernia. We discussed the options of robotic/laparoscopic repair, open repair, and nonoperative management. The risks/benefits and alternatives were discussed at length and all questions were answered. We discussed the risks and benefits of the use of mesh. The patient appears to understand and wishes to proceed with robotic umbilical and supraumbilical hernia repair with mesh.

## 2024-07-26 NOTE — HISTORY OF PRESENT ILLNESS
[de-identified] : Mr. HENDRICKS is a 73 year old man with umbilical bulge and pain, who presents for evaluation. Bulge has been present for 4-5 years and has increased in size over last 2-3 months. Reports intermittent pain, especially at end of day and with activity. No incarceration. Denies fever/chills/nausea/emesis or changes in bowel or bladder habits. Tolerating diet. Normal bowel movements.   denies smoking  PCI in 2017

## 2024-07-26 NOTE — PHYSICAL EXAM
[JVD] : no jugular venous distention  [No Rash or Lesion] : No rash or lesion [Alert] : alert [Oriented to Person] : oriented to person [Oriented to Place] : oriented to place [Oriented to Time] : oriented to time [Calm] : calm [de-identified] : No acute distress [de-identified] : No respiratory distress [de-identified] : Regular rate [de-identified] : soft, nontender. no rebound or guarding. palpable umbilical hernia, partially reducible, nontender. palpable small supraumbilical hernia - partially reducible, notnender.  [de-identified] : normal range of motion

## 2024-08-22 ENCOUNTER — NON-APPOINTMENT (OUTPATIENT)
Age: 74
End: 2024-08-22

## 2025-02-06 ENCOUNTER — APPOINTMENT (OUTPATIENT)
Dept: SURGERY | Facility: CLINIC | Age: 75
End: 2025-02-06
Payer: MEDICARE

## 2025-02-06 VITALS
RESPIRATION RATE: 16 BRPM | TEMPERATURE: 97.7 F | HEART RATE: 57 BPM | SYSTOLIC BLOOD PRESSURE: 155 MMHG | HEIGHT: 70.5 IN | BODY MASS INDEX: 38.65 KG/M2 | DIASTOLIC BLOOD PRESSURE: 88 MMHG | OXYGEN SATURATION: 95 % | WEIGHT: 273 LBS

## 2025-02-06 PROCEDURE — 99214 OFFICE O/P EST MOD 30 MIN: CPT

## 2025-03-25 ENCOUNTER — APPOINTMENT (OUTPATIENT)
Dept: SURGERY | Facility: HOSPITAL | Age: 75
End: 2025-03-25

## 2025-05-08 NOTE — ED ADULT TRIAGE NOTE - BEFAST LAST WELL KNOWN
My Chart message sent to patient.   
Patient request a call back about monitor results.     # [phone]  
Known

## 2025-05-28 ENCOUNTER — EMERGENCY (EMERGENCY)
Facility: HOSPITAL | Age: 75
LOS: 1 days | End: 2025-05-28
Attending: EMERGENCY MEDICINE
Payer: MEDICARE

## 2025-05-28 VITALS
RESPIRATION RATE: 18 BRPM | DIASTOLIC BLOOD PRESSURE: 92 MMHG | OXYGEN SATURATION: 95 % | TEMPERATURE: 99 F | HEART RATE: 70 BPM | SYSTOLIC BLOOD PRESSURE: 161 MMHG | WEIGHT: 265 LBS

## 2025-05-28 VITALS
SYSTOLIC BLOOD PRESSURE: 140 MMHG | HEART RATE: 60 BPM | OXYGEN SATURATION: 98 % | RESPIRATION RATE: 18 BRPM | DIASTOLIC BLOOD PRESSURE: 70 MMHG | TEMPERATURE: 98 F

## 2025-05-28 DIAGNOSIS — Z95.5 PRESENCE OF CORONARY ANGIOPLASTY IMPLANT AND GRAFT: Chronic | ICD-10-CM

## 2025-05-28 DIAGNOSIS — N36.8 OTHER SPECIFIED DISORDERS OF URETHRA: Chronic | ICD-10-CM

## 2025-05-28 LAB
ALBUMIN SERPL ELPH-MCNC: 4.1 G/DL — SIGNIFICANT CHANGE UP (ref 3.3–5.2)
ALP SERPL-CCNC: 47 U/L — SIGNIFICANT CHANGE UP (ref 40–120)
ALT FLD-CCNC: 13 U/L — SIGNIFICANT CHANGE UP
ANION GAP SERPL CALC-SCNC: 14 MMOL/L — SIGNIFICANT CHANGE UP (ref 5–17)
APTT BLD: 32 SEC — SIGNIFICANT CHANGE UP (ref 26.1–36.8)
AST SERPL-CCNC: 14 U/L — SIGNIFICANT CHANGE UP
BASOPHILS # BLD AUTO: 0.02 K/UL — SIGNIFICANT CHANGE UP (ref 0–0.2)
BASOPHILS NFR BLD AUTO: 0.3 % — SIGNIFICANT CHANGE UP (ref 0–2)
BILIRUB SERPL-MCNC: 0.8 MG/DL — SIGNIFICANT CHANGE UP (ref 0.4–2)
BUN SERPL-MCNC: 19.5 MG/DL — SIGNIFICANT CHANGE UP (ref 8–20)
CALCIUM SERPL-MCNC: 9.1 MG/DL — SIGNIFICANT CHANGE UP (ref 8.4–10.5)
CHLORIDE SERPL-SCNC: 101 MMOL/L — SIGNIFICANT CHANGE UP (ref 96–108)
CO2 SERPL-SCNC: 25 MMOL/L — SIGNIFICANT CHANGE UP (ref 22–29)
CREAT SERPL-MCNC: 0.83 MG/DL — SIGNIFICANT CHANGE UP (ref 0.5–1.3)
EGFR: 93 ML/MIN/1.73M2 — SIGNIFICANT CHANGE UP
EGFR: 93 ML/MIN/1.73M2 — SIGNIFICANT CHANGE UP
EOSINOPHIL # BLD AUTO: 0.01 K/UL — SIGNIFICANT CHANGE UP (ref 0–0.5)
EOSINOPHIL NFR BLD AUTO: 0.2 % — SIGNIFICANT CHANGE UP (ref 0–6)
GLUCOSE SERPL-MCNC: 98 MG/DL — SIGNIFICANT CHANGE UP (ref 70–99)
HCT VFR BLD CALC: 45.6 % — SIGNIFICANT CHANGE UP (ref 39–50)
HGB BLD-MCNC: 15.1 G/DL — SIGNIFICANT CHANGE UP (ref 13–17)
IMM GRANULOCYTES # BLD AUTO: 0.04 K/UL — SIGNIFICANT CHANGE UP (ref 0–0.07)
IMM GRANULOCYTES NFR BLD AUTO: 0.6 % — SIGNIFICANT CHANGE UP (ref 0–0.9)
INR BLD: 1.04 RATIO — SIGNIFICANT CHANGE UP (ref 0.85–1.16)
LYMPHOCYTES # BLD AUTO: 1.35 K/UL — SIGNIFICANT CHANGE UP (ref 1–3.3)
LYMPHOCYTES NFR BLD AUTO: 20.4 % — SIGNIFICANT CHANGE UP (ref 13–44)
MCHC RBC-ENTMCNC: 31 PG — SIGNIFICANT CHANGE UP (ref 27–34)
MCHC RBC-ENTMCNC: 33.1 G/DL — SIGNIFICANT CHANGE UP (ref 32–36)
MCV RBC AUTO: 93.6 FL — SIGNIFICANT CHANGE UP (ref 80–100)
MONOCYTES # BLD AUTO: 0.89 K/UL — SIGNIFICANT CHANGE UP (ref 0–0.9)
MONOCYTES NFR BLD AUTO: 13.5 % — SIGNIFICANT CHANGE UP (ref 2–14)
NEUTROPHILS # BLD AUTO: 4.3 K/UL — SIGNIFICANT CHANGE UP (ref 1.8–7.4)
NEUTROPHILS NFR BLD AUTO: 65 % — SIGNIFICANT CHANGE UP (ref 43–77)
NRBC # BLD AUTO: 0 K/UL — SIGNIFICANT CHANGE UP (ref 0–0)
NRBC # FLD: 0 K/UL — SIGNIFICANT CHANGE UP (ref 0–0)
NRBC BLD AUTO-RTO: 0 /100 WBCS — SIGNIFICANT CHANGE UP (ref 0–0)
NT-PROBNP SERPL-SCNC: 125 PG/ML — SIGNIFICANT CHANGE UP (ref 0–300)
PLATELET # BLD AUTO: 187 K/UL — SIGNIFICANT CHANGE UP (ref 150–400)
PMV BLD: 10.1 FL — SIGNIFICANT CHANGE UP (ref 7–13)
POTASSIUM SERPL-MCNC: 4.2 MMOL/L — SIGNIFICANT CHANGE UP (ref 3.5–5.3)
POTASSIUM SERPL-SCNC: 4.2 MMOL/L — SIGNIFICANT CHANGE UP (ref 3.5–5.3)
PROT SERPL-MCNC: 6.8 G/DL — SIGNIFICANT CHANGE UP (ref 6.6–8.7)
PROTHROM AB SERPL-ACNC: 11.7 SEC — SIGNIFICANT CHANGE UP (ref 9.9–13.4)
RBC # BLD: 4.87 M/UL — SIGNIFICANT CHANGE UP (ref 4.2–5.8)
RBC # FLD: 12.9 % — SIGNIFICANT CHANGE UP (ref 10.3–14.5)
SODIUM SERPL-SCNC: 140 MMOL/L — SIGNIFICANT CHANGE UP (ref 135–145)
TROPONIN T, HIGH SENSITIVITY RESULT: 11 NG/L — SIGNIFICANT CHANGE UP (ref 0–51)
TROPONIN T, HIGH SENSITIVITY RESULT: 11 NG/L — SIGNIFICANT CHANGE UP (ref 0–51)
WBC # BLD: 6.61 K/UL — SIGNIFICANT CHANGE UP (ref 3.8–10.5)
WBC # FLD AUTO: 6.61 K/UL — SIGNIFICANT CHANGE UP (ref 3.8–10.5)

## 2025-05-28 PROCEDURE — 71046 X-RAY EXAM CHEST 2 VIEWS: CPT | Mod: 26

## 2025-05-28 PROCEDURE — 93005 ELECTROCARDIOGRAM TRACING: CPT

## 2025-05-28 PROCEDURE — 36415 COLL VENOUS BLD VENIPUNCTURE: CPT

## 2025-05-28 PROCEDURE — 71046 X-RAY EXAM CHEST 2 VIEWS: CPT

## 2025-05-28 PROCEDURE — 96374 THER/PROPH/DIAG INJ IV PUSH: CPT

## 2025-05-28 PROCEDURE — 84484 ASSAY OF TROPONIN QUANT: CPT

## 2025-05-28 PROCEDURE — 85730 THROMBOPLASTIN TIME PARTIAL: CPT

## 2025-05-28 PROCEDURE — 85610 PROTHROMBIN TIME: CPT

## 2025-05-28 PROCEDURE — 83880 ASSAY OF NATRIURETIC PEPTIDE: CPT

## 2025-05-28 PROCEDURE — 80053 COMPREHEN METABOLIC PANEL: CPT

## 2025-05-28 PROCEDURE — 93010 ELECTROCARDIOGRAM REPORT: CPT

## 2025-05-28 PROCEDURE — 99285 EMERGENCY DEPT VISIT HI MDM: CPT

## 2025-05-28 PROCEDURE — 85025 COMPLETE CBC W/AUTO DIFF WBC: CPT

## 2025-05-28 PROCEDURE — 99285 EMERGENCY DEPT VISIT HI MDM: CPT | Mod: 25

## 2025-05-28 RX ORDER — KETOROLAC TROMETHAMINE 30 MG/ML
15 INJECTION, SOLUTION INTRAMUSCULAR; INTRAVENOUS ONCE
Refills: 0 | Status: DISCONTINUED | OUTPATIENT
Start: 2025-05-28 | End: 2025-05-28

## 2025-05-28 RX ADMIN — KETOROLAC TROMETHAMINE 15 MILLIGRAM(S): 30 INJECTION, SOLUTION INTRAMUSCULAR; INTRAVENOUS at 17:30

## 2025-05-28 NOTE — ED PROVIDER NOTE - OBJECTIVE STATEMENT
74-year-old male patient with history of hypertension, coronary artery disease, 2 stents placed in 2017, follows with Dr. Butler,  Presents to the ED complaining of 2 days of chest pain.  Patient states that since yesterday he has not felt chest pain in his upper chest, midline, worse with deep breathing and movement of his shoulders.  Patient denies having any symptoms like this in the past.  No shortness of breath.  No fevers.  No history of PE or DVT.  Patient does not smoke.  Patient took Tylenol with mild relief of his symptoms.  Patient was seen by his cardiologist earlier this morning for routine checkup but did not tell him of the symptoms.

## 2025-05-28 NOTE — ED ADULT TRIAGE NOTE - CHIEF COMPLAINT QUOTE
Pt c/o chest pain since yesterday.  Worse with inhalation.  Denies radiation. Pt has hx of 2 stents placed at [facility] in 2017.

## 2025-05-28 NOTE — ED PROVIDER NOTE - PROGRESS NOTE DETAILS
Patient received as sign out. Resting comfortably in bed. HD stable. Has no complaints at this time. repeat trop negative delta. Patient stable for discharge with follow up with Dr. Butler.  Eli Archuleta MD

## 2025-05-28 NOTE — ED PROVIDER NOTE - CLINICAL SUMMARY MEDICAL DECISION MAKING FREE TEXT BOX
74-year-old male patient presents to the ED complaining of 2 days of chest pain, worse with movement and deep breathing.  No recent fever, chills, cough, no recent heavy lifting.  No associated shortness of breath.  No dyspnea on exertion.  No history of PE or DVT.  Patient was seen by his cardiologist this morning for routine workup.  In the ED, vital stable, patient no acute distress.  No hypoxia.  EKG showing no signs of ischemia.  Lungs clear bilaterally, no murmurs.  Chest pain is worse with movement.  Will check labs, x-ray, treat symptomatically and reassess

## 2025-05-28 NOTE — ED PROVIDER NOTE - AXIS
[FreeTextEntry1] : 1 colon cancer screening pt will be scheduled for  colon cancer screening in March April  due to her breast cancer \par 2. reflux She is doing well. She will start to alternate  her famotidine with  pantoprazole in an attempt to stop ppi .We discussed long term use and risks  .  I will check her b12 mg levels and had bun creatinine recently.  discussed Rule of 2's; pt should avoid eating too much; too fast; too spicy; too lousy; less than two hours before bed \par -Things to avoid including overeating, spicy foods, tight clothing, eating within three hours of bed, this list is not all inclusive. \par -For treatment of reflux, possible options discussed including diet control, H2 blockers, PPIs, as well as coating motility agents discussed as treatment options. Timing of meals and proximity of last meal to sleep were discussed. If symptoms persist, a formal gastrointestinal evaluation is needed. \par \par 3.  hepatic fibrosis  she is seeing hepatologist now who will manage her and will continue with her hep b vaccinations.  \par 4asthma Asthma is believed to be caused by inherited (genetic) and environmental factor, but its exact cause is unknown. Asthma may be triggered by allergens, lung infections, or irritants in the air. Asthma triggers are different for each person \par -Inhaler technique reviewed as well as oral hygiene techniques reviewed with patient. Avoidance of cold air, extremes of temperature, rescue inhaler should be used before exercise. Order of medication reviewed with patient. Recommended use of a cool mist humidifier in the bedroom. \par \par She should take her  medication and inhalers regularly  and fu with her pulmonary. \par 5 palpitations  She is having frequent episodes and should reduce caffeine and fu with cardiologist Precious should have holter and was to have one  last year but  didn’t have it done due to covid. She also had covid infection and this could increase her risks for arrhythmias .She will call her cardiologist for an appt.   Left Deviation

## 2025-05-28 NOTE — ED PROVIDER NOTE - CARE PROVIDER_API CALL
Chinmay Butler  Interventional Cardiology  [address] [place] [address], [address]  [place] [address]  Phone: [phone]  Fax: [phone]  Follow Up Time:

## 2025-05-28 NOTE — ED PROVIDER NOTE - PATIENT PORTAL LINK FT
You can access the FollowMyHealth Patient Portal offered by [facility] by registering at the following website: [email or web address]. By joining [employer]’s FollowMyHealth portal, you will also be able to view your health information using other applications (apps) compatible with our system.

## 2025-05-28 NOTE — ED ADULT NURSE NOTE - CHIEF COMPLAINT QUOTE
Pt c/o chest pain since yesterday.  Worse with inhalation.  Denies radiation. Pt has hx of 2 stents placed at [facility] in 2017.

## 2025-05-28 NOTE — ED PROVIDER NOTE - PHYSICAL EXAMINATION
General: Pt is well appearing. In no acute distress.   HEENT: Oropharynx clear, Moist mucous membranes  Eyes: PERRL  Neck:. Trachea midline  Cardiac: Regular rate and regular rhythm. +S1/S2. No murmurs, rubs or gallops. 2+ radial pulses bilaterally.  No reproducible chest wall tenderness  Resp: Speaking in full sentences, breath sounds equal and clear bilaterally. No wheezes, rales or rhonchi.  Abd: Soft, non-tender, non-distended.  No guarding or rebound.  MSK: extremities grossly normal, no signs of external trauma  Skin: No rashes, abrasions or lacerations.  Neuro: Moves all extremities symmetrically. No motor or sensory deficits

## 2025-05-28 NOTE — ED ADULT NURSE NOTE - OBJECTIVE STATEMENT
Pt A+Ox4 c/o CP that started yesterday. pt denies pain radiating anywhere. Pt denies SOB. Respirations are equal and unlabored on room air. Pt speaking compete coherent sentences, Pt denies ABD pain, back pain, ha, N/V/D, dizziness, fevers, chills or body aches. Pt on telebox at this time. Pt left in position of comfort, wheels of stretcher locked and in the lowest position. Call bell within reach.